# Patient Record
Sex: FEMALE | Race: WHITE | NOT HISPANIC OR LATINO
[De-identification: names, ages, dates, MRNs, and addresses within clinical notes are randomized per-mention and may not be internally consistent; named-entity substitution may affect disease eponyms.]

---

## 2020-11-20 ENCOUNTER — APPOINTMENT (OUTPATIENT)
Dept: CARDIOLOGY | Facility: CLINIC | Age: 75
End: 2020-11-20
Payer: MEDICARE

## 2020-11-20 VITALS
OXYGEN SATURATION: 96 % | WEIGHT: 202 LBS | BODY MASS INDEX: 39.66 KG/M2 | HEIGHT: 60 IN | SYSTOLIC BLOOD PRESSURE: 138 MMHG | DIASTOLIC BLOOD PRESSURE: 82 MMHG | HEART RATE: 69 BPM

## 2020-11-20 DIAGNOSIS — Z82.49 FAMILY HISTORY OF ISCHEMIC HEART DISEASE AND OTHER DISEASES OF THE CIRCULATORY SYSTEM: ICD-10-CM

## 2020-11-20 DIAGNOSIS — Z82.3 FAMILY HISTORY OF STROKE: ICD-10-CM

## 2020-11-20 PROCEDURE — 99204 OFFICE O/P NEW MOD 45 MIN: CPT

## 2020-11-20 PROCEDURE — 99214 OFFICE O/P EST MOD 30 MIN: CPT

## 2020-12-17 RX ORDER — METFORMIN HYDROCHLORIDE 500 MG/1
500 TABLET, COATED ORAL
Refills: 0 | Status: ACTIVE | COMMUNITY
Start: 2020-12-17

## 2020-12-17 RX ORDER — ROSUVASTATIN CALCIUM 20 MG/1
20 TABLET, FILM COATED ORAL
Refills: 0 | Status: ACTIVE | COMMUNITY
Start: 2020-12-17

## 2020-12-17 NOTE — PHYSICAL EXAM
[General Appearance - Well Developed] : well developed [General Appearance - Well Nourished] : well nourished [General Appearance - In No Acute Distress] : no acute distress [Normal Conjunctiva] : the conjunctiva exhibited no abnormalities [No Jugular Venous Pisano A Waves] : no jugular venous pisano A waves [Heart Sounds] : normal S1 and S2 [Respiration, Rhythm And Depth] : normal respiratory rhythm and effort [Auscultation Breath Sounds / Voice Sounds] : lungs were clear to auscultation bilaterally [Abdomen Soft] : soft [Abdomen Tenderness] : non-tender [Cyanosis, Localized] : no localized cyanosis [FreeTextEntry1] : No significant edema

## 2020-12-17 NOTE — REASON FOR VISIT
[FreeTextEntry1] : Roberta Mckenna 75 years old diabetes hypertension obesity abnormal CT coronary angio with nonobstructive coronary disease

## 2020-12-17 NOTE — DISCUSSION/SUMMARY
[FreeTextEntry1] : The patient will continue on her present regimen of medications.  She should continue risk factor modification with weight reduction exercise.  Her LDL should be maintained lower than 70 and presently LDL is 61\par \par Routine follow with me again in 6 months\par Overall she is clinically stable from a cardiac point of view but does have multiple risk factors for ischemic heart disease and needs measures for risk factor reduction which are being undertaken

## 2020-12-17 NOTE — ASSESSMENT
[FreeTextEntry1] : Roberta Mckenna has diabetes hypertension hyperlipidemia all under good control.  She has evidence of atherosclerosis and coronary disease on CT coronary angiogram nonobstructive from several years ago.  She is asymptomatic without chest pain chest pressure shortness of breath she is overweight but is making great attempts at losing weight.  She is on appropriate medications for hypertension and diabetes\par \par 1.  Heart murmur secondary to hypertrophic cardiomyopathy with concentric left ventricular hypertrophy.  Asymptomatic at the present time and stable\par \par 2.  Hyperlipidemia on statin cholesterol 163 triglycerides 129 HDL 76 LDL 61 at goal\par \par 3.  Diabetes type 2 on oral agents recent hemoglobin A1c 5.9\par \par 4.  Elevated BMI

## 2021-06-24 ENCOUNTER — NON-APPOINTMENT (OUTPATIENT)
Age: 76
End: 2021-06-24

## 2021-06-24 ENCOUNTER — APPOINTMENT (OUTPATIENT)
Dept: CARDIOLOGY | Facility: CLINIC | Age: 76
End: 2021-06-24
Payer: MEDICARE

## 2021-06-24 VITALS
SYSTOLIC BLOOD PRESSURE: 132 MMHG | BODY MASS INDEX: 40.44 KG/M2 | WEIGHT: 206 LBS | DIASTOLIC BLOOD PRESSURE: 70 MMHG | HEART RATE: 70 BPM | HEIGHT: 60 IN | OXYGEN SATURATION: 96 %

## 2021-06-24 PROCEDURE — 93000 ELECTROCARDIOGRAM COMPLETE: CPT

## 2021-06-24 PROCEDURE — 99214 OFFICE O/P EST MOD 30 MIN: CPT

## 2021-06-24 NOTE — HISTORY OF PRESENT ILLNESS
[FreeTextEntry1] : Roberta is 75 years old with hypertension well-controlled diabetes type 2 well-controlled hyperlipidemia on statin, elevated BMI (39) and a heart murmur.  She had a CT coronary angiogram many years ago which did reveal nonobstructive coronary disease.  She has been diagnosed with a hypertrophic cardiomyopathy with some calcification of the aortic valve\par \par She is a past history of uterine carcinoma many years ago no recurrence\par \par She denies chest pain chest pressure shortness of breath.  She is active but does not do formal exercise such as vigorous walking.  She denies orthopnea PND palpitations dizziness or syncope.\par \par Noninvasive cardiac evaluation\par EKG normal sinus rhythm left ventricular hypertrophy with repolarization unchanged\par 2D echo 2/18/2020 moderate concentric left ventricular hypertrophy normal left ventricular systolic function grade 1 diastolic dysfunction with impaired relaxation idiopathic hypertrophic subaortic stenosis with systolic anterior motion of the mitral valve severe mitral annular calcification mild pulmonary hypertension\par \par Carotid Doppler revealed some moderate intimal thickening in the right common carotid artery less than 50% stenosis in the right bulb the left common carotid artery had no significant atherosclerotic plaquing and no significant stenosis\par \par Since last visit she has had some increasing shortness of breath with exertion.  She has no exertional chest pain or exertional dizziness\par \par A repeat echocardiogram again revealed mild concentric left ventricle hypertrophy with basal septal hypertrophy left ventricular outflow tract gradient of 59 mm not noted on the prior echo mild to moderate mitral valve regurgitation systolic anterior motion of the anterior leaflet of the mitral valve.  There appears to be some degree of aortic stenosis but cannot be accurately assessed because of the LVOT obstruction\par \par Then she had an episode of shortness of breath with a moderate amount of exertion which was more than she has had in the past..  She has no edema orthopnea PND\par \par EKG reveals normal sinus rhythm left ventricle hypertrophy with repolarization\par \par She has evidence of a hypertrophic left ventricle.  Previous echoes are presently not available to me

## 2021-06-24 NOTE — ASSESSMENT
[FreeTextEntry1] : Roberta Mckenna has diabetes hypertension hyperlipidemia all under good control.  She has evidence of atherosclerosis and coronary disease on CT coronary angiogram nonobstructive from several years ago.  She is asymptomatic without chest pain chest pressure shortness of breath she is overweight but is making great attempts at losing weight.  She is on appropriate medications for hypertension and diabetes\par \par 1.  Heart murmur secondary to hypertrophic cardiomyopathy with concentric left ventricular hypertrophy.  With resting gradient of 59 mm.  The murmur does increase with Valsalva.  She recently had increasing shortness of breath which is now less but all episodes of shortness of breath are exertionally related.  This certainly could be related to the obstructive hypertrophic cardiomyopathy and diastolic dysfunction.\par \par 2.  Hyperlipidemia on statin cholesterol 163 triglycerides 129 HDL 76 LDL 61 at goal\par \par 3.  Diabetes type 2 on oral agents recent hemoglobin A1c 5.9\par \par 4.  Elevated BMI

## 2021-06-24 NOTE — REASON FOR VISIT
[FreeTextEntry1] : Alena Barretoust 76 years old recent increase in her shortness of breath with known hypertrophic cardiomyopathy with abnormal echo with moderate outflow tract obstruction

## 2021-07-01 ENCOUNTER — APPOINTMENT (OUTPATIENT)
Dept: CT IMAGING | Facility: CLINIC | Age: 76
End: 2021-07-01
Payer: MEDICARE

## 2021-07-01 ENCOUNTER — OUTPATIENT (OUTPATIENT)
Dept: OUTPATIENT SERVICES | Facility: HOSPITAL | Age: 76
LOS: 1 days | End: 2021-07-01
Payer: MEDICARE

## 2021-07-01 DIAGNOSIS — I10 ESSENTIAL (PRIMARY) HYPERTENSION: ICD-10-CM

## 2021-07-01 DIAGNOSIS — I42.2 OTHER HYPERTROPHIC CARDIOMYOPATHY: ICD-10-CM

## 2021-07-01 DIAGNOSIS — Z85.42 PERSONAL HISTORY OF MALIGNANT NEOPLASM OF OTHER PARTS OF UTERUS: Chronic | ICD-10-CM

## 2021-07-01 DIAGNOSIS — E78.2 MIXED HYPERLIPIDEMIA: ICD-10-CM

## 2021-07-01 PROCEDURE — 75574 CT ANGIO HRT W/3D IMAGE: CPT

## 2021-07-01 PROCEDURE — 82565 ASSAY OF CREATININE: CPT

## 2021-07-01 PROCEDURE — 75574 CT ANGIO HRT W/3D IMAGE: CPT | Mod: 26,MH

## 2021-07-07 ENCOUNTER — NON-APPOINTMENT (OUTPATIENT)
Age: 76
End: 2021-07-07

## 2021-07-12 ENCOUNTER — OUTPATIENT (OUTPATIENT)
Dept: OUTPATIENT SERVICES | Facility: HOSPITAL | Age: 76
LOS: 1 days | End: 2021-07-12
Payer: MEDICARE

## 2021-07-12 VITALS
OXYGEN SATURATION: 98 % | SYSTOLIC BLOOD PRESSURE: 157 MMHG | RESPIRATION RATE: 20 BRPM | HEART RATE: 76 BPM | TEMPERATURE: 99 F | DIASTOLIC BLOOD PRESSURE: 71 MMHG | HEIGHT: 55 IN | WEIGHT: 197.09 LBS

## 2021-07-12 VITALS
HEART RATE: 63 BPM | RESPIRATION RATE: 22 BRPM | DIASTOLIC BLOOD PRESSURE: 70 MMHG | OXYGEN SATURATION: 95 % | SYSTOLIC BLOOD PRESSURE: 139 MMHG

## 2021-07-12 DIAGNOSIS — Z85.42 PERSONAL HISTORY OF MALIGNANT NEOPLASM OF OTHER PARTS OF UTERUS: Chronic | ICD-10-CM

## 2021-07-12 DIAGNOSIS — I25.10 ATHEROSCLEROTIC HEART DISEASE OF NATIVE CORONARY ARTERY WITHOUT ANGINA PECTORIS: ICD-10-CM

## 2021-07-12 DIAGNOSIS — I42.2 OTHER HYPERTROPHIC CARDIOMYOPATHY: ICD-10-CM

## 2021-07-12 LAB
ANION GAP SERPL CALC-SCNC: 15 MMOL/L — SIGNIFICANT CHANGE UP (ref 5–17)
BASOPHILS # BLD AUTO: 0.05 K/UL — SIGNIFICANT CHANGE UP (ref 0–0.2)
BASOPHILS NFR BLD AUTO: 0.9 % — SIGNIFICANT CHANGE UP (ref 0–2)
BUN SERPL-MCNC: 25 MG/DL — HIGH (ref 7–23)
CALCIUM SERPL-MCNC: 10.3 MG/DL — SIGNIFICANT CHANGE UP (ref 8.4–10.5)
CHLORIDE SERPL-SCNC: 103 MMOL/L — SIGNIFICANT CHANGE UP (ref 96–108)
CO2 SERPL-SCNC: 20 MMOL/L — LOW (ref 22–31)
CREAT SERPL-MCNC: 0.89 MG/DL — SIGNIFICANT CHANGE UP (ref 0.5–1.3)
EOSINOPHIL # BLD AUTO: 0.12 K/UL — SIGNIFICANT CHANGE UP (ref 0–0.5)
EOSINOPHIL NFR BLD AUTO: 2.2 % — SIGNIFICANT CHANGE UP (ref 0–6)
GLUCOSE BLDC GLUCOMTR-MCNC: 118 MG/DL — HIGH (ref 70–99)
GLUCOSE SERPL-MCNC: 122 MG/DL — HIGH (ref 70–99)
HCT VFR BLD CALC: 37.3 % — SIGNIFICANT CHANGE UP (ref 34.5–45)
HGB BLD-MCNC: 12.2 G/DL — SIGNIFICANT CHANGE UP (ref 11.5–15.5)
IMM GRANULOCYTES NFR BLD AUTO: 0.2 % — SIGNIFICANT CHANGE UP (ref 0–1.5)
LYMPHOCYTES # BLD AUTO: 1.5 K/UL — SIGNIFICANT CHANGE UP (ref 1–3.3)
LYMPHOCYTES # BLD AUTO: 27.8 % — SIGNIFICANT CHANGE UP (ref 13–44)
MCHC RBC-ENTMCNC: 29.2 PG — SIGNIFICANT CHANGE UP (ref 27–34)
MCHC RBC-ENTMCNC: 32.7 GM/DL — SIGNIFICANT CHANGE UP (ref 32–36)
MCV RBC AUTO: 89.2 FL — SIGNIFICANT CHANGE UP (ref 80–100)
MONOCYTES # BLD AUTO: 0.59 K/UL — SIGNIFICANT CHANGE UP (ref 0–0.9)
MONOCYTES NFR BLD AUTO: 10.9 % — SIGNIFICANT CHANGE UP (ref 2–14)
NEUTROPHILS # BLD AUTO: 3.12 K/UL — SIGNIFICANT CHANGE UP (ref 1.8–7.4)
NEUTROPHILS NFR BLD AUTO: 58 % — SIGNIFICANT CHANGE UP (ref 43–77)
NRBC # BLD: 0 /100 WBCS — SIGNIFICANT CHANGE UP (ref 0–0)
PLATELET # BLD AUTO: 159 K/UL — SIGNIFICANT CHANGE UP (ref 150–400)
POTASSIUM SERPL-MCNC: 4.3 MMOL/L — SIGNIFICANT CHANGE UP (ref 3.5–5.3)
POTASSIUM SERPL-SCNC: 4.3 MMOL/L — SIGNIFICANT CHANGE UP (ref 3.5–5.3)
RBC # BLD: 4.18 M/UL — SIGNIFICANT CHANGE UP (ref 3.8–5.2)
RBC # FLD: 14.2 % — SIGNIFICANT CHANGE UP (ref 10.3–14.5)
SODIUM SERPL-SCNC: 138 MMOL/L — SIGNIFICANT CHANGE UP (ref 135–145)
WBC # BLD: 5.39 K/UL — SIGNIFICANT CHANGE UP (ref 3.8–10.5)
WBC # FLD AUTO: 5.39 K/UL — SIGNIFICANT CHANGE UP (ref 3.8–10.5)

## 2021-07-12 PROCEDURE — 99153 MOD SED SAME PHYS/QHP EA: CPT

## 2021-07-12 PROCEDURE — 93005 ELECTROCARDIOGRAM TRACING: CPT

## 2021-07-12 PROCEDURE — 85025 COMPLETE CBC W/AUTO DIFF WBC: CPT

## 2021-07-12 PROCEDURE — C1769: CPT

## 2021-07-12 PROCEDURE — 99152 MOD SED SAME PHYS/QHP 5/>YRS: CPT

## 2021-07-12 PROCEDURE — 82962 GLUCOSE BLOOD TEST: CPT

## 2021-07-12 PROCEDURE — 80048 BASIC METABOLIC PNL TOTAL CA: CPT

## 2021-07-12 PROCEDURE — 93458 L HRT ARTERY/VENTRICLE ANGIO: CPT | Mod: 26

## 2021-07-12 PROCEDURE — C1894: CPT

## 2021-07-12 PROCEDURE — 93010 ELECTROCARDIOGRAM REPORT: CPT

## 2021-07-12 PROCEDURE — 93458 L HRT ARTERY/VENTRICLE ANGIO: CPT

## 2021-07-12 RX ORDER — METFORMIN HYDROCHLORIDE 850 MG/1
1 TABLET ORAL
Qty: 0 | Refills: 0 | DISCHARGE

## 2021-07-12 RX ORDER — METOPROLOL TARTRATE 50 MG
1 TABLET ORAL
Qty: 30 | Refills: 3
Start: 2021-07-12 | End: 2021-11-08

## 2021-07-12 RX ORDER — METOPROLOL TARTRATE 50 MG
1 TABLET ORAL
Qty: 0 | Refills: 0 | DISCHARGE

## 2021-07-12 NOTE — ASU DISCHARGE PLAN (ADULT/PEDIATRIC) - CARE PROVIDER_API CALL
Jackson Lee (MD)  Cardiovascular Disease; Internal Medicine; Interventional Cardiology  [address] [place] [address]  Phone: [phone]  Fax: [phone]  Follow Up Time: 2 weeks

## 2021-07-23 ENCOUNTER — APPOINTMENT (OUTPATIENT)
Dept: CARDIOLOGY | Facility: CLINIC | Age: 76
End: 2021-07-23
Payer: MEDICARE

## 2021-07-23 ENCOUNTER — RX RENEWAL (OUTPATIENT)
Age: 76
End: 2021-07-23

## 2021-07-23 VITALS
DIASTOLIC BLOOD PRESSURE: 70 MMHG | HEART RATE: 78 BPM | SYSTOLIC BLOOD PRESSURE: 125 MMHG | WEIGHT: 195 LBS | BODY MASS INDEX: 38.08 KG/M2 | OXYGEN SATURATION: 96 %

## 2021-07-23 PROCEDURE — 99214 OFFICE O/P EST MOD 30 MIN: CPT

## 2021-07-23 RX ORDER — VALSARTAN AND HYDROCHLOROTHIAZIDE 160; 25 MG/1; MG/1
160-25 TABLET, FILM COATED ORAL
Refills: 0 | Status: DISCONTINUED | COMMUNITY
Start: 2020-12-17 | End: 2021-07-23

## 2021-07-23 RX ORDER — VALSARTAN 160 MG/1
160 TABLET, COATED ORAL DAILY
Qty: 90 | Refills: 0 | Status: ACTIVE | COMMUNITY
Start: 2021-07-23 | End: 1900-01-01

## 2021-07-23 NOTE — DISCUSSION/SUMMARY
[FreeTextEntry1] : 1.  Continue with weight loss\par 2.  Continue metoprolol 100 in the a.m. and 50 in the p.m. which can be increased to 100 twice daily\par 3.  Change valsartan/hydrochlorthiazide to just valsartan 160 so as to avoid any diuresis which could worsen the gradient\par 4.  If shortness of breath persists on higher doses of beta-blocker, we can consider adding Norpace 150 3 times daily although side effects potential proarrhythmia (unlikely) and dry mouth etc.  Patient does not like side effects from medication\par \par Patient will follow up again in 6 weeks.  She will increase her level of exercise.  I would have a low threshold to increase her metoprolol to 100 twice daily

## 2021-07-23 NOTE — HISTORY OF PRESENT ILLNESS
[FreeTextEntry1] : Roberta is 75 years old with hypertension well-controlled diabetes type 2 well-controlled hyperlipidemia on statin, elevated BMI (39) and a heart murmur.  She had a CT coronary angiogram many years ago which did reveal nonobstructive coronary disease.  She has been diagnosed with a hypertrophic cardiomyopathy with some calcification of the aortic valve\par \par She is a past history of uterine carcinoma many years ago no recurrence\par \par She denies chest pain chest pressure shortness of breath.  She is active but does not do formal exercise such as vigorous walking.  She denies orthopnea PND palpitations dizziness or syncope.\par \par Noninvasive cardiac evaluation\par EKG normal sinus rhythm left ventricular hypertrophy with repolarization unchanged\par 2D echo 2/18/2020 moderate concentric left ventricular hypertrophy normal left ventricular systolic function grade 1 diastolic dysfunction with impaired relaxation idiopathic hypertrophic subaortic stenosis with systolic anterior motion of the mitral valve severe mitral annular calcification mild pulmonary hypertension\par \par Carotid Doppler revealed some moderate intimal thickening in the right common carotid artery less than 50% stenosis in the right bulb the left common carotid artery had no significant atherosclerotic plaquing and no significant stenosis\par \par Since last visit she has had some increasing shortness of breath with exertion.  She has no exertional chest pain or exertional dizziness\par \par A repeat echocardiogram again revealed mild concentric left ventricle hypertrophy with basal septal hypertrophy left ventricular outflow tract gradient of 59 mm not noted on the prior echo mild to moderate mitral valve regurgitation systolic anterior motion of the anterior leaflet of the mitral valve.  There appears to be some degree of aortic stenosis but cannot be accurately assessed because of the LVOT obstruction\par \par Then she had an episode of shortness of breath with a moderate amount of exertion which was more than she has had in the past..  She has no edema orthopnea PND\par \par EKG reveals normal sinus rhythm left ventricle hypertrophy with repolarization\par \par CT coronary angiogram was nonconclusive but suggested the possibility of significant stenosis\par \par Subsequent to this she underwent cardiac catheterization which revealed nonobstructive coronary disease no significant stenosis, hyperdynamic left ventricle with a resting gradient which increased significantly with provocation such as PVC post PVC and Valsalva\par \par Patient had been complaining of increasing shortness of breath.  We increased her beta-blocker to 100 in the morning and 50 at night.  She has lost 6 pounds.  Overall she is feeling better with less shortness of breath she is able to do more activity.  She has no chest pain palpitations dizziness or syncope\par \par Patient remains on valsartan 160/12.5.  She is now on Toprol- in the a.m. and 50 in the p.m. along with her other medications for hyperlipidemia and diabetes\par \par \par \par She has evidence of a hypertrophic left ventricle.  Previous echoes are presently not available to me

## 2021-07-23 NOTE — REASON FOR VISIT
[FreeTextEntry1] : Roberta Mckenna with a hypertrophic cardiomyopathy recently underwent catheterization which revealed nonobstructive coronary disease and some resting gradient with a hyperdynamic ventricle which increased with provocative maneuvers.  She is here for follow-up

## 2021-07-23 NOTE — ASSESSMENT
[FreeTextEntry1] : Roberta Mckenna has diabetes hypertension hyperlipidemia all under good control.  She has evidence of atherosclerosis and coronary disease on CT coronary angiogram nonobstructive from several years ago.  She is asymptomatic without chest pain chest pressure shortness of breath she is overweight but is making great attempts at losing weight.  She is on appropriate medications for hypertension and diabetes.  Recent cardiac catheterization revealed nonobstructive coronary disease with a hypertrophic cardiomyopathy with a dynamic and resting gradient\par \par 1.  Hypertrophic cardiomyopathy with resting and provocable gradient now improved symptoms of shortness of breath on 100 of metoprolol in the morning and 50 at night.  I would stop the valsartan 160/12.5 and just leave her on valsartan without the diuretic\par \par 2.  Hyperlipidemia on statin cholesterol 163 triglycerides 129 HDL 76 LDL 61 at goal\par \par 3.  Diabetes type 2 on oral agents recent hemoglobin A1c 5.9\par \par 4.  Elevated BMI patient has lost 6 pounds since last visit\par \par 5.  Nonobstructive coronary disease

## 2021-09-03 ENCOUNTER — NON-APPOINTMENT (OUTPATIENT)
Age: 76
End: 2021-09-03

## 2021-09-03 ENCOUNTER — APPOINTMENT (OUTPATIENT)
Dept: CARDIOLOGY | Facility: CLINIC | Age: 76
End: 2021-09-03
Payer: MEDICARE

## 2021-09-03 VITALS
SYSTOLIC BLOOD PRESSURE: 134 MMHG | OXYGEN SATURATION: 97 % | HEART RATE: 73 BPM | HEIGHT: 60 IN | DIASTOLIC BLOOD PRESSURE: 84 MMHG | WEIGHT: 194 LBS | BODY MASS INDEX: 38.09 KG/M2 | RESPIRATION RATE: 17 BRPM

## 2021-09-03 PROCEDURE — 99213 OFFICE O/P EST LOW 20 MIN: CPT

## 2021-09-03 NOTE — ASSESSMENT
[FreeTextEntry1] : Roberta Mckenna has diabetes hypertension hyperlipidemia all under good control.  She has evidence of atherosclerosis and coronary disease on CT coronary angiogram nonobstructive from several years ago.  She is asymptomatic without chest pain chest pressure shortness of breath she is overweight but is making great attempts at losing weight.  She is on appropriate medications for hypertension and diabetes.  Recent cardiac catheterization revealed nonobstructive coronary disease with a hypertrophic cardiomyopathy with a dynamic and resting gradient.  The patient is feeling better since the last visit with some weight loss and the increase in the Toprol to 150 mg a day.\par \par 1.  Hypertrophic cardiomyopathy with resting and provocable gradient now improved symptoms of shortness of breath on 100 of metoprolol in the morning and 50 at night.  Her pulse rate is still 75-80 and there is more room for higher dose beta-blocker\par \par 2.  Hyperlipidemia on statin cholesterol 163 triglycerides 129 HDL 76 LDL 61 at goal\par \par 3.  Diabetes type 2 on oral agents recent hemoglobin A1c 5.9\par \par 4.  Elevated BMI patient has lost 6 pounds since last visit\par \par 5.  Nonobstructive coronary disease

## 2021-09-03 NOTE — DISCUSSION/SUMMARY
[FreeTextEntry1] : The patient continues to do better and we have more room to work with beta-blocker.  I recommend that she increase the Toprol-XL to 100 twice daily for a total of 200 a day and remain on the valsartan.  She will continue with risk factor modification for calcified coronaries with nonobstructive disease\par \par Routine follow with me again in 3 months\par Urged weight reduction

## 2021-09-03 NOTE — HISTORY OF PRESENT ILLNESS
[FreeTextEntry1] : Roberta is 75 years old with hypertension well-controlled diabetes type 2 well-controlled hyperlipidemia on statin, elevated BMI (39) and a heart murmur.  She had a CT coronary angiogram many years ago which did reveal nonobstructive coronary disease.  She has been diagnosed with a hypertrophic cardiomyopathy with some calcification of the aortic valve\par \par She is a past history of uterine carcinoma many years ago no recurrence\par \par She denies chest pain chest pressure shortness of breath.  She is active but does not do formal exercise such as vigorous walking.  She denies orthopnea PND palpitations dizziness or syncope.\par \par Noninvasive cardiac evaluation\par EKG normal sinus rhythm left ventricular hypertrophy with repolarization unchanged\par 2D echo 2/18/2020 moderate concentric left ventricular hypertrophy normal left ventricular systolic function grade 1 diastolic dysfunction with impaired relaxation idiopathic hypertrophic subaortic stenosis with systolic anterior motion of the mitral valve severe mitral annular calcification mild pulmonary hypertension\par \par Carotid Doppler revealed some moderate intimal thickening in the right common carotid artery less than 50% stenosis in the right bulb the left common carotid artery had no significant atherosclerotic plaquing and no significant stenosis\par \par Since last visit she has had some increasing shortness of breath with exertion.  She has no exertional chest pain or exertional dizziness\par \par A repeat echocardiogram again revealed mild concentric left ventricle hypertrophy with basal septal hypertrophy left ventricular outflow tract gradient of 59 mm not noted on the prior echo mild to moderate mitral valve regurgitation systolic anterior motion of the anterior leaflet of the mitral valve.  There appears to be some degree of aortic stenosis but cannot be accurately assessed because of the LVOT obstruction\par \par Then she had an episode of shortness of breath with a moderate amount of exertion which was more than she has had in the past..  She has no edema orthopnea PND\par \par EKG reveals normal sinus rhythm left ventricle hypertrophy with repolarization\par \par CT coronary angiogram was nonconclusive but suggested the possibility of significant stenosis\par \par Subsequent to this she underwent cardiac catheterization which revealed nonobstructive coronary disease no significant stenosis, hyperdynamic left ventricle with a resting gradient which increased significantly with provocation such as PVC post PVC and Valsalva\par \par Patient had been complaining of increasing shortness of breath.  We increased her beta-blocker to 100 in the morning and 50 at night.  She has lost 6 pounds.  Overall she is feeling better with less shortness of breath she is able to do more activity.  She has no chest pain palpitations dizziness or syncope\par \par Patient remains on valsartan 160/12.5.  She is now on Toprol- in the a.m. and 50 in the p.m. along with her other medications for hyperlipidemia and diabetes\par \par She continues to feel better with less shortness of breath and is tolerating 150 mg of Toprol.  She has no chest pain dizziness or syncope.  She denies palpitations\par \par \par \par She has evidence of a hypertrophic left ventricle.  Previous echoes are presently not available to me

## 2021-09-15 NOTE — HISTORY OF PRESENT ILLNESS
[FreeTextEntry1] : Roberta is 75 years old with hypertension well-controlled diabetes type 2 well-controlled hyperlipidemia on statin, elevated BMI (39) and a heart murmur.  She had a CT coronary angiogram many years ago which did reveal nonobstructive coronary disease.  She has been diagnosed with a hypertrophic cardiomyopathy with some calcification of the aortic valve\par \par She is a past history of uterine carcinoma many years ago no recurrence\par \par She denies chest pain chest pressure shortness of breath.  She is active but does not do formal exercise such as vigorous walking.  She denies orthopnea PND palpitations dizziness or syncope.\par \par Noninvasive cardiac evaluation\par EKG normal sinus rhythm left ventricular hypertrophy with repolarization unchanged\par 2D echo 2/18/2020 moderate concentric left ventricular hypertrophy normal left ventricular systolic function grade 1 diastolic dysfunction with impaired relaxation idiopathic hypertrophic subaortic stenosis with systolic anterior motion of the mitral valve severe mitral annular calcification mild pulmonary hypertension\par \par Carotid Doppler revealed some moderate intimal thickening in the right common carotid artery less than 50% stenosis in the right bulb the left common carotid artery had no significant atherosclerotic plaquing and no significant stenosis\par \par She is followed carefully by Dr. Juan Manuel Valdez and has routine blood work echocardiograms etc.\par \par She has evidence of a hypertrophic left ventricle.  Previous echoes are presently not available to me
cell [phone]/patient

## 2021-10-29 ENCOUNTER — INPATIENT (INPATIENT)
Facility: HOSPITAL | Age: 76
LOS: 2 days | Discharge: HOME | End: 2021-11-01
Attending: INTERNAL MEDICINE | Admitting: INTERNAL MEDICINE
Payer: MEDICARE

## 2021-10-29 ENCOUNTER — OUTPATIENT (OUTPATIENT)
Dept: OUTPATIENT SERVICES | Facility: HOSPITAL | Age: 76
LOS: 1 days | Discharge: HOME | End: 2021-10-29

## 2021-10-29 ENCOUNTER — RESULT REVIEW (OUTPATIENT)
Age: 76
End: 2021-10-29

## 2021-10-29 VITALS
HEIGHT: 60 IN | RESPIRATION RATE: 18 BRPM | DIASTOLIC BLOOD PRESSURE: 75 MMHG | TEMPERATURE: 98 F | SYSTOLIC BLOOD PRESSURE: 109 MMHG | HEART RATE: 84 BPM | OXYGEN SATURATION: 98 %

## 2021-10-29 VITALS
DIASTOLIC BLOOD PRESSURE: 57 MMHG | SYSTOLIC BLOOD PRESSURE: 142 MMHG | RESPIRATION RATE: 18 BRPM | OXYGEN SATURATION: 98 % | HEART RATE: 85 BPM

## 2021-10-29 VITALS
SYSTOLIC BLOOD PRESSURE: 113 MMHG | RESPIRATION RATE: 18 BRPM | TEMPERATURE: 98 F | WEIGHT: 188.05 LBS | HEIGHT: 60 IN | HEART RATE: 90 BPM | DIASTOLIC BLOOD PRESSURE: 57 MMHG | OXYGEN SATURATION: 97 %

## 2021-10-29 DIAGNOSIS — Z85.42 PERSONAL HISTORY OF MALIGNANT NEOPLASM OF OTHER PARTS OF UTERUS: Chronic | ICD-10-CM

## 2021-10-29 DIAGNOSIS — K21.00 GASTRO-ESOPHAGEAL REFLUX DISEASE WITH ESOPHAGITIS, WITHOUT BLEEDING: ICD-10-CM

## 2021-10-29 DIAGNOSIS — I10 ESSENTIAL (PRIMARY) HYPERTENSION: ICD-10-CM

## 2021-10-29 DIAGNOSIS — E11.9 TYPE 2 DIABETES MELLITUS WITHOUT COMPLICATIONS: ICD-10-CM

## 2021-10-29 DIAGNOSIS — Z29.9 ENCOUNTER FOR PROPHYLACTIC MEASURES, UNSPECIFIED: ICD-10-CM

## 2021-10-29 DIAGNOSIS — K92.0 HEMATEMESIS: ICD-10-CM

## 2021-10-29 DIAGNOSIS — E78.5 HYPERLIPIDEMIA, UNSPECIFIED: ICD-10-CM

## 2021-10-29 DIAGNOSIS — R55 SYNCOPE AND COLLAPSE: ICD-10-CM

## 2021-10-29 LAB
ALBUMIN SERPL ELPH-MCNC: 4.4 G/DL — SIGNIFICANT CHANGE UP (ref 3.3–5)
ALP SERPL-CCNC: 49 U/L — SIGNIFICANT CHANGE UP (ref 40–120)
ALT FLD-CCNC: 12 U/L — SIGNIFICANT CHANGE UP (ref 4–33)
ANION GAP SERPL CALC-SCNC: 18 MMOL/L — HIGH (ref 7–14)
APTT BLD: 29.7 SEC — SIGNIFICANT CHANGE UP (ref 27–36.3)
AST SERPL-CCNC: 22 U/L — SIGNIFICANT CHANGE UP (ref 4–32)
BASOPHILS # BLD AUTO: 0.06 K/UL — SIGNIFICANT CHANGE UP (ref 0–0.2)
BASOPHILS NFR BLD AUTO: 0.5 % — SIGNIFICANT CHANGE UP (ref 0–2)
BILIRUB SERPL-MCNC: 0.7 MG/DL — SIGNIFICANT CHANGE UP (ref 0.2–1.2)
BLD GP AB SCN SERPL QL: NEGATIVE — SIGNIFICANT CHANGE UP
BUN SERPL-MCNC: 37 MG/DL — HIGH (ref 7–23)
CALCIUM SERPL-MCNC: 10.3 MG/DL — SIGNIFICANT CHANGE UP (ref 8.4–10.5)
CHLORIDE SERPL-SCNC: 100 MMOL/L — SIGNIFICANT CHANGE UP (ref 98–107)
CO2 SERPL-SCNC: 23 MMOL/L — SIGNIFICANT CHANGE UP (ref 22–31)
CREAT SERPL-MCNC: 0.98 MG/DL — SIGNIFICANT CHANGE UP (ref 0.5–1.3)
EOSINOPHIL # BLD AUTO: 0.06 K/UL — SIGNIFICANT CHANGE UP (ref 0–0.5)
EOSINOPHIL NFR BLD AUTO: 0.5 % — SIGNIFICANT CHANGE UP (ref 0–6)
GLUCOSE BLDC GLUCOMTR-MCNC: 100 MG/DL — HIGH (ref 70–99)
GLUCOSE BLDC GLUCOMTR-MCNC: 123 MG/DL — HIGH (ref 70–99)
GLUCOSE SERPL-MCNC: 151 MG/DL — HIGH (ref 70–99)
HCT VFR BLD CALC: 37.6 % — SIGNIFICANT CHANGE UP (ref 34.5–45)
HGB BLD-MCNC: 11.9 G/DL — SIGNIFICANT CHANGE UP (ref 11.5–15.5)
IANC: 8.59 K/UL — HIGH (ref 1.5–8.5)
IMM GRANULOCYTES NFR BLD AUTO: 0.3 % — SIGNIFICANT CHANGE UP (ref 0–1.5)
INR BLD: 1.18 RATIO — HIGH (ref 0.88–1.16)
LYMPHOCYTES # BLD AUTO: 1.47 K/UL — SIGNIFICANT CHANGE UP (ref 1–3.3)
LYMPHOCYTES # BLD AUTO: 13.1 % — SIGNIFICANT CHANGE UP (ref 13–44)
MCHC RBC-ENTMCNC: 28.9 PG — SIGNIFICANT CHANGE UP (ref 27–34)
MCHC RBC-ENTMCNC: 31.6 GM/DL — LOW (ref 32–36)
MCV RBC AUTO: 91.3 FL — SIGNIFICANT CHANGE UP (ref 80–100)
MONOCYTES # BLD AUTO: 0.98 K/UL — HIGH (ref 0–0.9)
MONOCYTES NFR BLD AUTO: 8.8 % — SIGNIFICANT CHANGE UP (ref 2–14)
NEUTROPHILS # BLD AUTO: 8.59 K/UL — HIGH (ref 1.8–7.4)
NEUTROPHILS NFR BLD AUTO: 76.8 % — SIGNIFICANT CHANGE UP (ref 43–77)
NRBC # BLD: 0 /100 WBCS — SIGNIFICANT CHANGE UP
NRBC # FLD: 0 K/UL — SIGNIFICANT CHANGE UP
PLATELET # BLD AUTO: 216 K/UL — SIGNIFICANT CHANGE UP (ref 150–400)
POTASSIUM SERPL-MCNC: 4.6 MMOL/L — SIGNIFICANT CHANGE UP (ref 3.5–5.3)
POTASSIUM SERPL-SCNC: 4.6 MMOL/L — SIGNIFICANT CHANGE UP (ref 3.5–5.3)
PROT SERPL-MCNC: 7.4 G/DL — SIGNIFICANT CHANGE UP (ref 6–8.3)
PROTHROM AB SERPL-ACNC: 13.3 SEC — SIGNIFICANT CHANGE UP (ref 10.6–13.6)
RBC # BLD: 4.12 M/UL — SIGNIFICANT CHANGE UP (ref 3.8–5.2)
RBC # FLD: 14.5 % — SIGNIFICANT CHANGE UP (ref 10.3–14.5)
RH IG SCN BLD-IMP: POSITIVE — SIGNIFICANT CHANGE UP
RH IG SCN BLD-IMP: POSITIVE — SIGNIFICANT CHANGE UP
SODIUM SERPL-SCNC: 141 MMOL/L — SIGNIFICANT CHANGE UP (ref 135–145)
TROPONIN T, HIGH SENSITIVITY RESULT: 26 NG/L — SIGNIFICANT CHANGE UP
TROPONIN T, HIGH SENSITIVITY RESULT: 29 NG/L — SIGNIFICANT CHANGE UP
WBC # BLD: 11.19 K/UL — HIGH (ref 3.8–10.5)
WBC # FLD AUTO: 11.19 K/UL — HIGH (ref 3.8–10.5)

## 2021-10-29 PROCEDURE — 70450 CT HEAD/BRAIN W/O DYE: CPT | Mod: 26,MA

## 2021-10-29 PROCEDURE — 72125 CT NECK SPINE W/O DYE: CPT | Mod: 26,MA

## 2021-10-29 PROCEDURE — 12031 INTMD RPR S/A/T/EXT 2.5 CM/<: CPT | Mod: GC

## 2021-10-29 PROCEDURE — 99285 EMERGENCY DEPT VISIT HI MDM: CPT | Mod: 25,GC

## 2021-10-29 PROCEDURE — 71045 X-RAY EXAM CHEST 1 VIEW: CPT | Mod: 26

## 2021-10-29 PROCEDURE — 88305 TISSUE EXAM BY PATHOLOGIST: CPT | Mod: 26

## 2021-10-29 PROCEDURE — 93010 ELECTROCARDIOGRAM REPORT: CPT | Mod: 59,GC

## 2021-10-29 RX ORDER — LANOLIN ALCOHOL/MO/W.PET/CERES
3 CREAM (GRAM) TOPICAL AT BEDTIME
Refills: 0 | Status: DISCONTINUED | OUTPATIENT
Start: 2021-10-29 | End: 2021-11-01

## 2021-10-29 RX ORDER — ZINC SULFATE TAB 220 MG (50 MG ZINC EQUIVALENT) 220 (50 ZN) MG
1 TAB ORAL
Qty: 0 | Refills: 0 | DISCHARGE

## 2021-10-29 RX ORDER — MULTIVIT WITH MIN/MFOLATE/K2 340-15/3 G
0 POWDER (GRAM) ORAL
Qty: 0 | Refills: 0 | DISCHARGE

## 2021-10-29 RX ORDER — PANTOPRAZOLE SODIUM 20 MG/1
80 TABLET, DELAYED RELEASE ORAL ONCE
Refills: 0 | Status: COMPLETED | OUTPATIENT
Start: 2021-10-29 | End: 2021-10-29

## 2021-10-29 RX ORDER — ACETAMINOPHEN 500 MG
2 TABLET ORAL
Qty: 0 | Refills: 0 | DISCHARGE

## 2021-10-29 RX ORDER — SODIUM CHLORIDE 9 MG/ML
1000 INJECTION, SOLUTION INTRAVENOUS
Refills: 0 | Status: DISCONTINUED | OUTPATIENT
Start: 2021-10-29 | End: 2021-11-01

## 2021-10-29 RX ORDER — ASCORBIC ACID 60 MG
1 TABLET,CHEWABLE ORAL
Qty: 0 | Refills: 0 | DISCHARGE

## 2021-10-29 RX ORDER — SODIUM CHLORIDE 9 MG/ML
1000 INJECTION INTRAMUSCULAR; INTRAVENOUS; SUBCUTANEOUS
Refills: 0 | Status: DISCONTINUED | OUTPATIENT
Start: 2021-10-29 | End: 2021-10-30

## 2021-10-29 RX ORDER — INSULIN LISPRO 100/ML
VIAL (ML) SUBCUTANEOUS
Refills: 0 | Status: DISCONTINUED | OUTPATIENT
Start: 2021-10-29 | End: 2021-11-01

## 2021-10-29 RX ORDER — DEXTROSE 50 % IN WATER 50 %
25 SYRINGE (ML) INTRAVENOUS ONCE
Refills: 0 | Status: DISCONTINUED | OUTPATIENT
Start: 2021-10-29 | End: 2021-11-01

## 2021-10-29 RX ORDER — GLUCAGON INJECTION, SOLUTION 0.5 MG/.1ML
1 INJECTION, SOLUTION SUBCUTANEOUS ONCE
Refills: 0 | Status: DISCONTINUED | OUTPATIENT
Start: 2021-10-29 | End: 2021-11-01

## 2021-10-29 RX ORDER — ROSUVASTATIN CALCIUM 5 MG/1
1 TABLET ORAL
Qty: 0 | Refills: 0 | DISCHARGE

## 2021-10-29 RX ORDER — CHOLECALCIFEROL (VITAMIN D3) 125 MCG
1 CAPSULE ORAL
Qty: 0 | Refills: 0 | DISCHARGE

## 2021-10-29 RX ORDER — PANTOPRAZOLE SODIUM 20 MG/1
1 TABLET, DELAYED RELEASE ORAL
Qty: 0 | Refills: 0 | DISCHARGE

## 2021-10-29 RX ORDER — DEXTROSE 50 % IN WATER 50 %
12.5 SYRINGE (ML) INTRAVENOUS ONCE
Refills: 0 | Status: DISCONTINUED | OUTPATIENT
Start: 2021-10-29 | End: 2021-11-01

## 2021-10-29 RX ORDER — ACETAMINOPHEN 500 MG
650 TABLET ORAL EVERY 6 HOURS
Refills: 0 | Status: DISCONTINUED | OUTPATIENT
Start: 2021-10-29 | End: 2021-11-01

## 2021-10-29 RX ORDER — ONDANSETRON 8 MG/1
4 TABLET, FILM COATED ORAL EVERY 8 HOURS
Refills: 0 | Status: DISCONTINUED | OUTPATIENT
Start: 2021-10-29 | End: 2021-11-01

## 2021-10-29 RX ORDER — SEMAGLUTIDE 0.68 MG/ML
0 INJECTION, SOLUTION SUBCUTANEOUS
Qty: 0 | Refills: 0 | DISCHARGE

## 2021-10-29 RX ORDER — FLUTICASONE PROPIONATE AND SALMETEROL 50; 250 UG/1; UG/1
1 POWDER ORAL; RESPIRATORY (INHALATION)
Qty: 0 | Refills: 0 | DISCHARGE

## 2021-10-29 RX ORDER — DEXTROSE 50 % IN WATER 50 %
15 SYRINGE (ML) INTRAVENOUS ONCE
Refills: 0 | Status: DISCONTINUED | OUTPATIENT
Start: 2021-10-29 | End: 2021-11-01

## 2021-10-29 RX ORDER — ONDANSETRON 8 MG/1
4 TABLET, FILM COATED ORAL ONCE
Refills: 0 | Status: COMPLETED | OUTPATIENT
Start: 2021-10-29 | End: 2021-10-29

## 2021-10-29 RX ORDER — METOPROLOL TARTRATE 50 MG
1 TABLET ORAL
Qty: 0 | Refills: 0 | DISCHARGE

## 2021-10-29 RX ORDER — ATORVASTATIN CALCIUM 80 MG/1
20 TABLET, FILM COATED ORAL AT BEDTIME
Refills: 0 | Status: DISCONTINUED | OUTPATIENT
Start: 2021-10-29 | End: 2021-11-01

## 2021-10-29 RX ADMIN — PANTOPRAZOLE SODIUM 80 MILLIGRAM(S): 20 TABLET, DELAYED RELEASE ORAL at 21:35

## 2021-10-29 RX ADMIN — ONDANSETRON 4 MILLIGRAM(S): 8 TABLET, FILM COATED ORAL at 21:35

## 2021-10-29 RX ADMIN — SODIUM CHLORIDE 100 MILLILITER(S): 9 INJECTION INTRAMUSCULAR; INTRAVENOUS; SUBCUTANEOUS at 23:43

## 2021-10-29 RX ADMIN — SODIUM CHLORIDE 500 MILLILITER(S): 9 INJECTION INTRAMUSCULAR; INTRAVENOUS; SUBCUTANEOUS at 21:34

## 2021-10-29 RX ADMIN — Medication 650 MILLIGRAM(S): at 21:36

## 2021-10-29 NOTE — ED PROVIDER NOTE - ATTENDING CONTRIBUTION TO CARE
Ilir Cardoso DO:  patient seen and evaluated with the resident.  I was present for key portions of the History & Physical, and I agree with the Impression & Plan. 75 yo f pmh htn, hld, Ilir Cardoso, DO:  patient seen and evaluated with the resident.  I was present for key portions of the History & Physical, and I agree with the Impression & Plan. collateral via ems.  75 yo f pmh htn, hld, on asa 81 mg, pw syncope. pt s/p endoscopy/colonoscopy today. s/p biopsy of two polyps w/ Dr. Connors. at home wanted to eat and then had prodrome of lightheadedness, fatigue, nausea, and then lost consciousness. hit stove. found on floor by family immediately. still on floor w/ ems however conscious. while in triage pt became lightheaded again, found to be hypotensive systolic 60s. by time of arrival in tr-a pt systolic 100. pt mentating well, aaox4, provides complete hx, noted profuse diaphoresis. pt WITHOUT cp, sob, ha. no abd pain. noted two small lacs to posterior scalp, bleeding controlled, will require cleaning and repair. do not suspect acute drop in h/h. possible dehydration given procedure. will check labs, cth, ekg. will contact Dr. Connors for collateral and recs.

## 2021-10-29 NOTE — H&P ADULT - NSHPLABSRESULTS_GEN_ALL_CORE
11.9   11.19 )-----------( 216      ( 29 Oct 2021 19:47 )             37.6     Hgb Trend: 11.9<--  10-29    141  |  100  |  37<H>  ----------------------------<  151<H>  4.6   |  23  |  0.98    Ca    10.3      29 Oct 2021 19:47    TPro  7.4  /  Alb  4.4  /  TBili  0.7  /  DBili  x   /  AST  22  /  ALT  12  /  AlkPhos  49  10-29    Creatinine Trend: 0.98<--  PT/INR - ( 29 Oct 2021 19:47 )   PT: 13.3 sec;   INR: 1.18 ratio         PTT - ( 29 Oct 2021 19:47 )  PTT:29.7 sec          EKG is reviewed by me: NSR with LVH and repolarization changes     < from: CT Head No Cont (10.29.21 @ 19:58) >    FINDINGS:  The ventricles and sulci are prominent, compatible with age-related generalized cerebral volume loss.   There is no CT evidence for acute cerebral cortical infarct. There is no evidence of hemorrhage. There is periventricular and subcortical white matter hypoattenuation,  most compatible with chronic microvascular ischemic changes.   No mass effect is found in the brain.  There is no midline shift or herniation pattern.      The visualized portions of the paranasal sinuses and mastoid air cells are clear.    IMPRESSION:    No evidence of acute intracranial abnormality.  No evidence of hemorrhage.    Chronic changes as above.        < end of copied text >

## 2021-10-29 NOTE — ED PROVIDER NOTE - NS ED ROS FT
General: denies fever, chills, weight loss/weight gain.  HENT: denies nasal congestion, sore throat, rhinorrhea, ear pain.  Eyes: denies visual changes, blurred vision, eye discharge, eye redness.  Neck: denies neck pain, neck swelling.  CV: +syncope; denies chest pain, palpitations.  Resp: denies difficulty breathing, cough.  Abdominal: +nausea, vomiting (now resolved).  MSK: denies muscle aches, bony pain, leg pain, leg swelling.  Neuro: denies headaches, numbness, tingling, dizziness, lightheadedness.  Skin: denies rashes, cuts, bruises.  Hematologic: denies unexplained bruises.

## 2021-10-29 NOTE — ED PROCEDURE NOTE - ATTENDING CONTRIBUTION TO CARE
Ilir Cardoso DO:  patient seen and evaluated with the resident.  I was present for key portions of the History & Physical, and I agree with the Impression & Plan.

## 2021-10-29 NOTE — H&P ADULT - PROBLEM SELECTOR PLAN 3
Pt had episode of hypotension at home as per EMS  -BP is currently on the softer side  -Will hold all anti-hypertensive medications for now

## 2021-10-29 NOTE — ED PROVIDER NOTE - OBJECTIVE STATEMENT
76F w/ EGD/colonoscopy, anemia today at 4:30 p/w syncopal episode.  Pt. went home and felt nauseous.  Proceeded to go eat and felt lightheaded.  Attempted to hold onto the cabinet but fell down, struck posterior aspect of head.  During triage, pt. felt similar sxs and VS were significant for hypotension (60s/40s).  Procedure done by Dr. Juan Manuel Ivan.  Pt. denies any CP, SOB, abd pain, urinary sxs.  Was told she may have some rectal bleeding after procedure.  States she had an episode of vomiting and felt like she threw up a blood clot.

## 2021-10-29 NOTE — ED ADULT NURSE REASSESSMENT NOTE - NS ED NURSE REASSESS COMMENT FT1
Patient resting on stretcher now. VSS. Had one episode of vomiting?old blood. Alert and oriented x4,  safety maintained, Nursing care is ongoing

## 2021-10-29 NOTE — H&P ADULT - PROBLEM SELECTOR PLAN 2
Pt had episode of dark emesis in the ED. Unclear if it was hematochezia   -Hgb stable w/ no further episode of emesis   -Trend CBC q6hrs   -S/p PPI 80 mg IV   -GI consult in the Am if pt continues to have emesis

## 2021-10-29 NOTE — ED ADULT NURSE NOTE - NSIMPLEMENTINTERV_GEN_ALL_ED
Implemented All Fall Risk Interventions:  [place] to call system. Call bell, personal items and telephone within reach. Instruct patient to call for assistance. Room bathroom lighting operational. Non-slip footwear when patient is off stretcher. Physically safe environment: no spills, clutter or unnecessary equipment. Stretcher in lowest position, wheels locked, appropriate side rails in place. Provide visual cue, wrist band, yellow gown, etc. Monitor gait and stability. Monitor for mental status changes and reorient to person, place, and time. Review medications for side effects contributing to fall risk. Reinforce activity limits and safety measures with patient and family.

## 2021-10-29 NOTE — H&P ADULT - NSHPPHYSICALEXAM_GEN_ALL_CORE
Vital Signs Last 24 Hrs  T(C): 36.6 (29 Oct 2021 19:05), Max: 36.8 (29 Oct 2021 14:23)  T(F): 97.9 (29 Oct 2021 19:05), Max: 98.3 (29 Oct 2021 14:23)  HR: 84 (29 Oct 2021 19:05) (83 - 90)  BP: 109/75 (29 Oct 2021 19:05) (103/67 - 142/57)  BP(mean): --  RR: 18 (29 Oct 2021 19:05) (18 - 23)  SpO2: 98% (29 Oct 2021 19:05) (97% - 98%)    GENERAL: NAD, alert and awake, answering questions appropriately  HEENT:  Scalp laceration x3, EOMI, PERRLA, conjunctiva and sclera clear, oral mucosa moist, clear w/o any exudate   NECK: Supple, No JVD  CHEST/LUNG: Clear to auscultation bilaterally; No wheeze  HEART: Systolic murmur; No murmurs, rubs, or gallops  ABDOMEN: Soft, Nontender, Nondistended; Bowel sounds present  EXTREMITIES:  2+ Peripheral Pulses, No clubbing, cyanosis, or edema  PSYCH: AAOx3, pleasant affect  NEUROLOGY: non-focal, cranial nerve 2-12 grossly intact. Strength 5/5 in all extremities. Sensation grossly intact.   SKIN: No rashes or lesions

## 2021-10-29 NOTE — ASU PREOP CHECKLIST - PATIENT'S PERSONAL PROPERTY REMOVED
Please call the [facility] at [phone] if any of your medications change.  This means: if a med is discontinued, a new med is started, or a dose is changed.  These meds may interact with your warfarin and we may need to adjust your dose.  This is especially important if you start any type of ANTIBIOTIC.    Also, please call if you have any admissions to the hospital, visits to an emergency room, procedures planned, or if any other medical provider has instructed you to hold your warfarin.     none

## 2021-10-29 NOTE — ED ADULT NURSE NOTE - OBJECTIVE STATEMENT
Pt received to trauma a a&ox4, ambulatory at baseline c/o dizziness. Pt states that she got a colonoscopy today and felt fine. Pt states that she returned home at 4:30 pm and started to feel very dizzy. Pt states that she became nauseous, and fell. Pt hit her head. Laceration noted to back of head, bleeding controlled. Pt states that her doctor told her to come to the ED to be evaluated. As per triage note, in triage pt began to feel the same symptoms and was hypotensive. On arrival to room, pt states she feels better. Pt denies any dizziness at this time. Pt c/o mild nausea. Pt denies chest pain, shortness of breath, abdominal pain. Pt in no acute distress at this time. Respirations even and unlabored. MD at bedside for eval. Comfort measures provided. Awaiting further orders. Pt received to trauma a a&ox4, ambulatory at baseline c/o dizziness. Pt states that she got a colonoscopy today and felt fine. Pt states that she returned home at 4:30 pm and started to feel very dizzy. Pt states that she became nauseous, and fell. Pt hit her head. Laceration noted to back of head, bleeding controlled. Pt states that her doctor told her to come to the ED to be evaluated. As per triage note, in triage pt began to feel the same symptoms and was hypotensive. On arrival to room, pt states she feels better. Pt denies any dizziness at this time. Pt c/o mild nausea. Pt denies chest pain, shortness of breath, abdominal pain. Pt in no acute distress at this time. Respirations even and unlabored. 18g iv placed in right ac. Blood drawn and sent to lab. MD at bedside for eval. Comfort measures provided. Awaiting further orders.

## 2021-10-29 NOTE — H&P ADULT - NSHPREVIEWOFSYSTEMS_GEN_ALL_CORE
REVIEW OF SYSTEMS:    CONSTITUTIONAL: No weakness, fevers or chills  EYES/ENT: No visual changes;  No vertigo or throat pain   NECK: No pain or stiffness  RESPIRATORY: No cough, wheezing, hemoptysis; No shortness of breath  CARDIOVASCULAR: No chest pain or palpitations  GASTROINTESTINAL: No abdominal or epigastric pain. +nausea; No diarrhea or constipation. No melena or hematochezia.  GENITOURINARY: No dysuria, frequency or hematuria  NEUROLOGICAL: +Dizziness   MUSCULOSKELETAL: No joint pain, no muscle ache   SKIN: No itching, burning, rashes, or lesions   All other review of systems is negative unless indicated above.

## 2021-10-29 NOTE — H&P ADULT - PROBLEM SELECTOR PLAN 1
Patient had an episode of syncope, most likely vasovagal/orthostatic hypotension based on hx and physical exam. Pt had an extensive prep with hypotension at home.   -Check orthostatics   -EKG shows NSR with LVH. trops were elevated but stable now  -Pt has hx of chronic heart murmur. Obtain TTE from outpatient   -Fall precautions

## 2021-10-29 NOTE — H&P ADULT - HISTORY OF PRESENT ILLNESS
76 yo F with PMhx of DMII, HTN, chronic heart murmur, and HLD presents to the ED after a syncopal episode. Pt was feeling nauseous and weak after undergoing colonoscopy and EGD this morning. When she returned home and tried to eat something, she collapsed on the floor. She hit her head on stove and broke the stove glass. She lost consciousness but woke up within couple of seconds. She was able to drag herself to the living room and called her son who then called EMS and . During the initial evaluation by EMS, she was found to have hypotension with SBP as low as 60s. She denies having any prodromal syndrome prior to her fall. She also denies any chest pain, SOB, palpitations, or focal deficits before or after the fall. She did have nausea and had two episodes of dark emesis en route and in the ED. Of note, she had multiple BMs the day before due to extensive prep. She also took her BP medications without eating or drinking anything this morning. She denies any hx of MI. She does have hx of LVH and heart murmur.   In the ED, her BP was on the softer side. Labs were significant for mild leukocytosis and elevated by stable trops. CT head showed no acute bleeding or mass.

## 2021-10-29 NOTE — ED PROVIDER NOTE - PROGRESS NOTE DETAILS
Eros PGY3 - W/u negative, no anemia or intracranial pathology.  Will admit to Dr. Ivan. Ilir Cardoso DO: episode of dark emesis. airway intact, on protonix. accepted to Dr. Jones service.

## 2021-10-29 NOTE — H&P ADULT - ASSESSMENT
78 yo F with PMhx of DMII, HTN, chronic heart murmur, and HLD presents to the ED after a syncopal episode, most likely vasovagal.

## 2021-10-29 NOTE — ED PROVIDER NOTE - CLINICAL SUMMARY MEDICAL DECISION MAKING FREE TEXT BOX
76F p/w syncopal episode after a EGD/colonoscopy earlier today.  Dark black stool noted on the external portion of rectum, VSS in the room but hypotensive in triage.  Multiple scalp wounds in the posterior scalp.  Pt. well appearing now.  Will obtain labs, CT head/c-spine, CXR.  Spoke w/ Dr. Ivan who wants pt. admitted to his service.  Will reassess.

## 2021-10-29 NOTE — ED PROCEDURE NOTE - PROCEDURE ADDITIONAL DETAILS
Two lacerations stapled, with the larger of the two needing three staples and the other one requiring one.  A piece of glass was removed from the smaller wound.  Extensive irrigation was performed and all FB in sight was removed.

## 2021-10-29 NOTE — ED ADULT TRIAGE NOTE - CHIEF COMPLAINT QUOTE
colonoscopy/endoscopy today.  arrived home 4 30 pm. states episode of dizziness,fell to floor. hit head on  stove. states unable to get up from floor. arrives to ed reporting not feeling well,  bp 60/14. placed hob flat to tr a  fs 144

## 2021-10-29 NOTE — ED PROVIDER NOTE - PHYSICAL EXAMINATION
GENERAL: Patient awake alert NAD.  HEENT: NC/AT, Moist mucous membranes.  LUNGS: CTAB, no wheezes or crackles.  CARDIAC: RRR, no m/r/g.  ABDOMEN: Soft, NT, ND, No rebound, guarding. Rectal exam performed w/ Kaitlin Emanuel RN. Dark blood on the external portion of the rectum.  EXT: No edema. No calf tenderness.  MSK: No pain with movement, no deformities.  NEURO: A&Ox3. Moving all extremities.  SKIN: Multiple wounds on posterior scalp, largest ~2cm in diameter.  PSYCH: Normal affect.

## 2021-10-29 NOTE — H&P ADULT - NSHPSOCIALHISTORY_GEN_ALL_CORE
She lives with her grandson. She denies any hx of smoking or alcohol consumption. She is able to perform all of her ADLs.

## 2021-10-30 LAB
A1C WITH ESTIMATED AVERAGE GLUCOSE RESULT: 5.4 % — SIGNIFICANT CHANGE UP (ref 4–5.6)
COVID-19 SPIKE DOMAIN AB INTERP: POSITIVE
COVID-19 SPIKE DOMAIN ANTIBODY RESULT: 227 U/ML — HIGH
ESTIMATED AVERAGE GLUCOSE: 108 — SIGNIFICANT CHANGE UP
GLUCOSE BLDC GLUCOMTR-MCNC: 103 MG/DL — HIGH (ref 70–99)
GLUCOSE BLDC GLUCOMTR-MCNC: 194 MG/DL — HIGH (ref 70–99)
GLUCOSE BLDC GLUCOMTR-MCNC: 217 MG/DL — HIGH (ref 70–99)
GLUCOSE BLDC GLUCOMTR-MCNC: 97 MG/DL — SIGNIFICANT CHANGE UP (ref 70–99)
HCT VFR BLD CALC: 31.5 % — LOW (ref 34.5–45)
HCT VFR BLD CALC: 31.8 % — LOW (ref 34.5–45)
HGB BLD-MCNC: 10.1 G/DL — LOW (ref 11.5–15.5)
HGB BLD-MCNC: 10.5 G/DL — LOW (ref 11.5–15.5)
MCHC RBC-ENTMCNC: 28.9 PG — SIGNIFICANT CHANGE UP (ref 27–34)
MCHC RBC-ENTMCNC: 29.1 PG — SIGNIFICANT CHANGE UP (ref 27–34)
MCHC RBC-ENTMCNC: 32.1 GM/DL — SIGNIFICANT CHANGE UP (ref 32–36)
MCHC RBC-ENTMCNC: 33 GM/DL — SIGNIFICANT CHANGE UP (ref 32–36)
MCV RBC AUTO: 87.6 FL — SIGNIFICANT CHANGE UP (ref 80–100)
MCV RBC AUTO: 90.8 FL — SIGNIFICANT CHANGE UP (ref 80–100)
NRBC # BLD: 0 /100 WBCS — SIGNIFICANT CHANGE UP
NRBC # BLD: 0 /100 WBCS — SIGNIFICANT CHANGE UP
NRBC # FLD: 0 K/UL — SIGNIFICANT CHANGE UP
NRBC # FLD: 0 K/UL — SIGNIFICANT CHANGE UP
PLATELET # BLD AUTO: 136 K/UL — LOW (ref 150–400)
PLATELET # BLD AUTO: 168 K/UL — SIGNIFICANT CHANGE UP (ref 150–400)
RBC # BLD: 3.47 M/UL — LOW (ref 3.8–5.2)
RBC # BLD: 3.63 M/UL — LOW (ref 3.8–5.2)
RBC # FLD: 14.3 % — SIGNIFICANT CHANGE UP (ref 10.3–14.5)
RBC # FLD: 14.5 % — SIGNIFICANT CHANGE UP (ref 10.3–14.5)
SARS-COV-2 IGG+IGM SERPL QL IA: 227 U/ML — HIGH
SARS-COV-2 IGG+IGM SERPL QL IA: POSITIVE
SARS-COV-2 RNA SPEC QL NAA+PROBE: SIGNIFICANT CHANGE UP
WBC # BLD: 10.24 K/UL — SIGNIFICANT CHANGE UP (ref 3.8–10.5)
WBC # BLD: 7.3 K/UL — SIGNIFICANT CHANGE UP (ref 3.8–10.5)
WBC # FLD AUTO: 10.24 K/UL — SIGNIFICANT CHANGE UP (ref 3.8–10.5)
WBC # FLD AUTO: 7.3 K/UL — SIGNIFICANT CHANGE UP (ref 3.8–10.5)

## 2021-10-30 PROCEDURE — 99223 1ST HOSP IP/OBS HIGH 75: CPT

## 2021-10-30 RX ORDER — PANTOPRAZOLE SODIUM 20 MG/1
40 TABLET, DELAYED RELEASE ORAL
Refills: 0 | Status: DISCONTINUED | OUTPATIENT
Start: 2021-10-30 | End: 2021-10-30

## 2021-10-30 RX ORDER — PANTOPRAZOLE SODIUM 20 MG/1
1 TABLET, DELAYED RELEASE ORAL
Qty: 0 | Refills: 0 | DISCHARGE

## 2021-10-30 RX ORDER — PANTOPRAZOLE SODIUM 20 MG/1
40 TABLET, DELAYED RELEASE ORAL
Refills: 0 | Status: DISCONTINUED | OUTPATIENT
Start: 2021-10-30 | End: 2021-11-01

## 2021-10-30 RX ADMIN — Medication 650 MILLIGRAM(S): at 13:40

## 2021-10-30 RX ADMIN — Medication 650 MILLIGRAM(S): at 23:11

## 2021-10-30 RX ADMIN — Medication 650 MILLIGRAM(S): at 04:57

## 2021-10-30 RX ADMIN — SODIUM CHLORIDE 100 MILLILITER(S): 9 INJECTION INTRAMUSCULAR; INTRAVENOUS; SUBCUTANEOUS at 04:54

## 2021-10-30 RX ADMIN — ONDANSETRON 4 MILLIGRAM(S): 8 TABLET, FILM COATED ORAL at 04:58

## 2021-10-30 RX ADMIN — Medication 650 MILLIGRAM(S): at 05:55

## 2021-10-30 RX ADMIN — PANTOPRAZOLE SODIUM 40 MILLIGRAM(S): 20 TABLET, DELAYED RELEASE ORAL at 18:30

## 2021-10-30 RX ADMIN — PANTOPRAZOLE SODIUM 40 MILLIGRAM(S): 20 TABLET, DELAYED RELEASE ORAL at 12:52

## 2021-10-30 RX ADMIN — Medication 1: at 12:55

## 2021-10-30 RX ADMIN — Medication 650 MILLIGRAM(S): at 22:21

## 2021-10-30 RX ADMIN — Medication 30 MILLILITER(S): at 11:48

## 2021-10-30 RX ADMIN — Medication 650 MILLIGRAM(S): at 11:44

## 2021-10-30 NOTE — PHYSICAL THERAPY INITIAL EVALUATION ADULT - ADDITIONAL COMMENTS
Patient lives with grandson in an apartment, +elevator. Patient reports being independent in ADLs and ambulation prior to admission.    Patient was left safely in bed, all lines/tubes intact and call bell within reach, RN aware

## 2021-10-30 NOTE — PHYSICAL THERAPY INITIAL EVALUATION ADULT - PERTINENT HX OF CURRENT PROBLEM, REHAB EVAL
Patient is a 76 year old female admitted to [facility]  after a syncopal episode. PMH: DMII, HTN, chronic heart murmur, and HLD.

## 2021-10-30 NOTE — PHYSICAL THERAPY INITIAL EVALUATION ADULT - PATIENT PROFILE REVIEW, REHAB EVAL
PT orders received: no formal activity order. Consult with MARLA Hdez, pt may participate in PT evaluation./yes

## 2021-10-31 ENCOUNTER — TRANSCRIPTION ENCOUNTER (OUTPATIENT)
Age: 76
End: 2021-10-31

## 2021-10-31 DIAGNOSIS — R55 SYNCOPE AND COLLAPSE: ICD-10-CM

## 2021-10-31 LAB
ANION GAP SERPL CALC-SCNC: 13 MMOL/L — SIGNIFICANT CHANGE UP (ref 7–14)
BUN SERPL-MCNC: 32 MG/DL — HIGH (ref 7–23)
CALCIUM SERPL-MCNC: 10 MG/DL — SIGNIFICANT CHANGE UP (ref 8.4–10.5)
CHLORIDE SERPL-SCNC: 102 MMOL/L — SIGNIFICANT CHANGE UP (ref 98–107)
CO2 SERPL-SCNC: 24 MMOL/L — SIGNIFICANT CHANGE UP (ref 22–31)
CREAT SERPL-MCNC: 0.91 MG/DL — SIGNIFICANT CHANGE UP (ref 0.5–1.3)
GLUCOSE BLDC GLUCOMTR-MCNC: 117 MG/DL — HIGH (ref 70–99)
GLUCOSE BLDC GLUCOMTR-MCNC: 120 MG/DL — HIGH (ref 70–99)
GLUCOSE BLDC GLUCOMTR-MCNC: 127 MG/DL — HIGH (ref 70–99)
GLUCOSE BLDC GLUCOMTR-MCNC: 134 MG/DL — HIGH (ref 70–99)
GLUCOSE SERPL-MCNC: 189 MG/DL — HIGH (ref 70–99)
HCT VFR BLD CALC: 32.8 % — LOW (ref 34.5–45)
HGB BLD-MCNC: 10.4 G/DL — LOW (ref 11.5–15.5)
MCHC RBC-ENTMCNC: 28.9 PG — SIGNIFICANT CHANGE UP (ref 27–34)
MCHC RBC-ENTMCNC: 31.7 GM/DL — LOW (ref 32–36)
MCV RBC AUTO: 91.1 FL — SIGNIFICANT CHANGE UP (ref 80–100)
NRBC # BLD: 0 /100 WBCS — SIGNIFICANT CHANGE UP
NRBC # FLD: 0 K/UL — SIGNIFICANT CHANGE UP
PLATELET # BLD AUTO: 160 K/UL — SIGNIFICANT CHANGE UP (ref 150–400)
POTASSIUM SERPL-MCNC: 4.2 MMOL/L — SIGNIFICANT CHANGE UP (ref 3.5–5.3)
POTASSIUM SERPL-SCNC: 4.2 MMOL/L — SIGNIFICANT CHANGE UP (ref 3.5–5.3)
RBC # BLD: 3.6 M/UL — LOW (ref 3.8–5.2)
RBC # FLD: 14.5 % — SIGNIFICANT CHANGE UP (ref 10.3–14.5)
SODIUM SERPL-SCNC: 139 MMOL/L — SIGNIFICANT CHANGE UP (ref 135–145)
WBC # BLD: 6.68 K/UL — SIGNIFICANT CHANGE UP (ref 3.8–10.5)
WBC # FLD AUTO: 6.68 K/UL — SIGNIFICANT CHANGE UP (ref 3.8–10.5)

## 2021-10-31 PROCEDURE — 73120 X-RAY EXAM OF HAND: CPT | Mod: 26,50

## 2021-10-31 PROCEDURE — 72220 X-RAY EXAM SACRUM TAILBONE: CPT | Mod: 26

## 2021-10-31 RX ADMIN — PANTOPRAZOLE SODIUM 40 MILLIGRAM(S): 20 TABLET, DELAYED RELEASE ORAL at 05:42

## 2021-10-31 RX ADMIN — Medication 650 MILLIGRAM(S): at 12:25

## 2021-10-31 RX ADMIN — PANTOPRAZOLE SODIUM 40 MILLIGRAM(S): 20 TABLET, DELAYED RELEASE ORAL at 18:15

## 2021-10-31 RX ADMIN — Medication 650 MILLIGRAM(S): at 21:32

## 2021-10-31 RX ADMIN — Medication 650 MILLIGRAM(S): at 22:34

## 2021-10-31 RX ADMIN — Medication 650 MILLIGRAM(S): at 11:25

## 2021-10-31 RX ADMIN — Medication 650 MILLIGRAM(S): at 08:44

## 2021-10-31 NOTE — DISCHARGE NOTE PROVIDER - NSDCCPCAREPLAN_GEN_ALL_CORE_FT
PRINCIPAL DISCHARGE DIAGNOSIS  Diagnosis: Vasovagal syncope  Assessment and Plan of Treatment: Follow up with your Primary Care Doctor within 1 week of discharge. Call to schedule an appointment        SECONDARY DISCHARGE DIAGNOSES  Diagnosis: Hematemesis  Assessment and Plan of Treatment: Follow up with your Primary Care Doctor within 1 week of discharge. Call to schedule an appointment      Diagnosis: Type 2 diabetes mellitus  Assessment and Plan of Treatment: Continue your medication regimen and a consistent carbohydrate diet (Meaning eating the same amount of carbohydrates at the same time each day). Monitor blood glucose levels throughout the day before meals and at bedtime. Record blood sugars and bring to outpatient providers appointment in order to be reviewed by your doctor for management modifications. If your sugars are more than 400 or less than 70 you should contact your PCP immediately. Monitor for signs/symptoms of low blood glucose, such as, dizziness, altered mental status, or cool/clammy skin. In addition, monitor for signs/symptoms of high blood glucose, such as, feeling hot, dry, fatigued, or with increased thirst/urination. Make regular podiatry appointments in order to have feet checked for wounds and uncontrolled toe nail growth to prevent infections, as well as, appointments with an ophthalmologist to monitor your vision.  Follow up with your Primary Care Doctor within 1 week of discharge. Call to schedule an appointment      Diagnosis: Hyperlipidemia  Assessment and Plan of Treatment: continue Atorvastatin  Follow up with your Primary Care Doctor within 1 week of discharge. Call to schedule an appointment      Diagnosis: Essential hypertension  Assessment and Plan of Treatment: Continue blood pressure medication regimen as directed. Monitor for any visual changes, headaches or dizziness.  Monitor blood pressure regularly.  Follow up with your primary care provider for further management for high blood pressure.  Follow up with your Primary Care Doctor within 1 week of discharge. Call to schedule an appointment     PRINCIPAL DISCHARGE DIAGNOSIS  Diagnosis: Vasovagal syncope  Assessment and Plan of Treatment: Likely secondary to dehydration in setting of NPO status for EGD/colonoscopy. Your B/L hand images were negative for fractures or evidence of glass. Your sacral xray could not rule outa  coccyx fracture. It was recommended to follow up with CT scan. Follow up with Dr. Ivan on Wednesday, call his office for appointment time.      SECONDARY DISCHARGE DIAGNOSES  Diagnosis: Essential hypertension  Assessment and Plan of Treatment: Continue blood pressure medication regimen as directed. Monitor for any visual changes, headaches or dizziness.  Monitor blood pressure regularly.  Follow up with your primary care provider for further management for high blood pressure.  Follow up with your Primary Care Doctor within 1 week of discharge. Call to schedule an appointment    Diagnosis: Hematemesis  Assessment and Plan of Treatment: You underwent EGD/colonoscopy: which showed hemorrhoids and rectal prolaspe (intestine prodrudes through anus), however this spontaneously resolved. You also have diverticulosis (small bulging pocuhes of the digestive tract). Two polyps were removed and biopsed.   Follow up with Dr. Ivan for polyp biopsy. Continue with protonix.       Diagnosis: Type 2 diabetes mellitus  Assessment and Plan of Treatment: Continue your medication regimen and a consistent carbohydrate diet (Meaning eating the same amount of carbohydrates at the same time each day). Monitor blood glucose levels throughout the day before meals and at bedtime. Record blood sugars and bring to outpatient providers appointment in order to be reviewed by your doctor for management modifications. If your sugars are more than 400 or less than 70 you should contact your PCP immediately. Monitor for signs/symptoms of low blood glucose, such as, dizziness, altered mental status, or cool/clammy skin. In addition, monitor for signs/symptoms of high blood glucose, such as, feeling hot, dry, fatigued, or with increased thirst/urination. Make regular podiatry appointments in order to have feet checked for wounds and uncontrolled toe nail growth to prevent infections, as well as, appointments with an ophthalmologist to monitor your vision.  Follow up with your Primary Care Doctor within 1 week of discharge. Call to schedule an appointment      Diagnosis: Hyperlipidemia  Assessment and Plan of Treatment: continue Atorvastatin  Follow up with your Primary Care Doctor within 1 week of discharge. Call to schedule an appointment       PRINCIPAL DISCHARGE DIAGNOSIS  Diagnosis: Vasovagal syncope  Assessment and Plan of Treatment: Likely secondary to dehydration in setting of NPO status for EGD/colonoscopy. Your B/L hand images were negative for fractures or evidence of glass. Your sacral xray did not show a definitive fracture. Follow up with Dr. Ivan on Wednesday, call his office for appointment time.      SECONDARY DISCHARGE DIAGNOSES  Diagnosis: Essential hypertension  Assessment and Plan of Treatment: Continue blood pressure medication regimen as directed, this medication was adjusted: for now only continue with your metoprolo lat night time. Dr. Ivan will resume your other blood pressure medications as an out patient if needed. Monitor for any visual changes, headaches or dizziness.  Monitor blood pressure regularly.  Follow up with your primary care provider for further management for high blood pressure.  Follow up with your Primary Care Doctor within 1 week of discharge. Call to schedule an appointment    Diagnosis: Hematemesis  Assessment and Plan of Treatment: You underwent EGD/colonoscopy: which showed hemorrhoids and rectal prolaspe (intestine prodrudes through anus), however this spontaneously resolved. You also have diverticulosis (small bulging pocuhes of the digestive tract). Two polyps were removed and biopsed.   Follow up with Dr. Ivan for polyp biopsy. Continue with protonix two times per day. Your aspirin has been discontinued.      Diagnosis: Type 2 diabetes mellitus  Assessment and Plan of Treatment: Your A1c is 5.4, thus your metformin has been discontinued. Continue your medication regimen and a consistent carbohydrate diet (Meaning eating the same amount of carbohydrates at the same time each day). Monitor blood glucose levels throughout the day before meals and at bedtime. Record blood sugars and bring to outpatient providers appointment in order to be reviewed by your doctor for management modifications. If your sugars are more than 400 or less than 70 you should contact your PCP immediately. Monitor for signs/symptoms of low blood glucose, such as, dizziness, altered mental status, or cool/clammy skin. In addition, monitor for signs/symptoms of high blood glucose, such as, feeling hot, dry, fatigued, or with increased thirst/urination. Make regular podiatry appointments in order to have feet checked for wounds and uncontrolled toe nail growth to prevent infections, as well as, appointments with an ophthalmologist to monitor your vision.  Follow up with your Primary Care Doctor within 1 week of discharge. Call to schedule an appointment      Diagnosis: Hyperlipidemia  Assessment and Plan of Treatment: continue Atorvastatin  Follow up with your Primary Care Doctor within 1 week of discharge. Call to schedule an appointment

## 2021-10-31 NOTE — DISCHARGE NOTE PROVIDER - HOSPITAL COURSE
76 yo F with PMhx of DMII, HTN, chronic heart murmur, and HLD presents to the ED after a syncopal episode post EGD/colonoscopy, most likely vasovagal.     Vasovagal syncope.   -Patient had an episode of syncope, most likely vasovagal/orthostatic hypotension based on hx and physical exam. Pt had an extensive prep with hypotension at home.   -EKG shows NSR with LVH. trops were elevated but stable now  -Pt has hx of chronic heart murmur.   -Fall precautions.  -Mild Orthostasis     Hematemesis.   -Pt had episode of dark emesis in the ED. Unclear if it was hematochezia   -Hgb stable w/ no further episode of emesis   -Trend CBC q6hrs   -S/p PPI 80 mg IV -on PPI oral BID  -Pt s/p EGD/Colonoscopy outpt 10/29, outpt f/u with GI    Essential hypertension.   -Pt had episode of hypotension at home as per EMS  -BP currently on the softer side  -All  anti-hypertensive medications held    Type 2 diabetes mellitus.   -C/w ISS and FS.  -HgbA1c 5.4    Hyperlipidemia.   -C/w atorvastatin.   78 yo F with PMhx of DMII, HTN, chronic heart murmur, and HLD presents to the ED after a syncopal episode post EGD/colonoscopy, most likely vasovagal.     Vasovagal syncope.   -Patient had an episode of syncope, most likely vasovagal/orthostatic hypotension based on hx and physical exam. Pt had an extensive prep with hypotension at home.   -EKG shows NSR with LVH. trops were elevated but stable now  -Pt has hx of chronic heart murmur.   -Mild Orthostasis   -B/L hand xray:There is no acute fracture or dislocation. 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 There is mild mineralization volar to the basilar joint. There is mild radial deviation of the first distal phalanx. No radiopaque foreign bodies identified.  -Sacrum/coccyx xray: There is a grade 1 anterolisthesis of L4 on L5 and L5 on S1. There is severe disc height loss at L4-5. There is lower lumbar facet arthropathy. Question an age-indeterminate obliquely oriented fracture at a proximal coccygeal segment. CT scan can be performed for further evaluation if clinically warranted. --> Out patient follow up with CT scan.    Hematemesis.   -Pt had episode of dark emesis in the ED. Unclear if it was hematochezia   -Hgb stable w/ no further episode of emesis   -Trend CBC q6hrs   -S/p PPI 80 mg IV -on PPI oral BID  -Pt s/p EGD/Colonoscopy outpt 10/29, outpt f/u with GI    Essential hypertension.   -Pt had episode of hypotension at home as per EMS  -BP currently on the softer side  -All  anti-hypertensive medications held    Type 2 diabetes mellitus.   -C/w ISS and FS.  -HgbA1c 5.4    Hyperlipidemia.   -C/w atorvastatin.    Pt medically stable for discharge on 11/1 as per discussion with Dr. Ivan.   Dispo: home with out patient PT    76 yo F with PMhx of DMII, HTN, chronic heart murmur, and HLD presents to the ED after a syncopal episode post EGD/colonoscopy, most likely vasovagal.     Vasovagal syncope.   -Patient had an episode of syncope, most likely vasovagal/orthostatic hypotension based on hx and physical exam. Pt had an extensive prep with hypotension at home.   -EKG shows NSR with LVH. trops were elevated but stable now  -Pt has hx of chronic heart murmur.   -Mild Orthostasis   -B/L hand xray:There is no acute fracture or dislocation. 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 There is mild mineralization volar to the basilar joint. There is mild radial deviation of the first distal phalanx. No radiopaque foreign bodies identified.  -Sacrum/coccyx xray: There is a grade 1 anterolisthesis of L4 on L5 and L5 on S1. There is severe disc height loss at L4-5. There is lower lumbar facet arthropathy. Question an age-indeterminate obliquely oriented fracture at a proximal coccygeal segment. CT scan can be performed for further evaluation if clinically warranted. --> Out patient follow up with CT scan.    Hematemesis.   -Pt had episode of dark emesis in the ED. Unclear if it was hematochezia   -Hgb stable w/ no further episode of emesis   -Trend CBC q6hrs   -S/p PPI 80 mg IV -on PPI oral BID  -Pt s/p EGD/Colonoscopy outpt 10/29, outpt f/u with GI    Essential hypertension.   -Pt had episode of hypotension at home as per EMS  -BP currently on the softer side  -All  anti-hypertensive medications held    Type 2 diabetes mellitus.   -C/w ISS and FS.  -HgbA1c 5.4    Hyperlipidemia.   -C/w atorvastatin.    Pt medically stable for discharge on 11/1 as per discussion with Dr. Ivna.   DC medications discussed with Dr. Ivan: will stop metformin, stop asa and c/w protonix, only metoprolol QHS (stop am metoprolol) and stop diovan. Pt aware of these changes and to f/u w/ Dr Ivan on Wedneday,  Dispo: home with out patient PT

## 2021-10-31 NOTE — CHART NOTE - NSCHARTNOTEFT_GEN_A_CORE
Pt s/p syncope with fall at home, concern for residual glass shards in both palms of hands, also c/o coccyx pain, awaiting Xray B/L hands, Xray Coccyx as requested by Dr. Ivan.

## 2021-10-31 NOTE — DISCHARGE NOTE PROVIDER - NSDCMRMEDTOKEN_GEN_ALL_CORE_FT
aspirin 81 mg oral delayed release tablet: 1 tab(s) orally once a day  metFORMIN 500 mg oral tablet, extended release: 1 tab(s) orally 2 times a day  RESTART 7/14  metoprolol succinate 100 mg oral tablet, extended release: 1 tab(s) orally once a day (in the morning)   metoprolol succinate 50 mg oral tablet, extended release: 1 tab(s) orally in evening  Protonix 40 mg oral delayed release tablet: 1 tab(s) orally 2 times a day  rosuvastatin 20 mg oral tablet: 1 tab(s) orally once a day  Tylenol 500 mg oral tablet: 2 tab(s) orally every 8 hours, As Needed  valsartan-hydrochlorothiazide 160mg-25mg oral tablet: 1 tab(s) orally once a day   metoprolol succinate 50 mg oral tablet, extended release: 1 tab(s) orally in evening  Out patient physical therapy: 3x/week  Protonix 40 mg oral delayed release tablet: 1 tab(s) orally 2 times a day  rosuvastatin 20 mg oral tablet: 1 tab(s) orally once a day  Tylenol 500 mg oral tablet: 2 tab(s) orally every 8 hours, As Needed

## 2021-11-01 ENCOUNTER — TRANSCRIPTION ENCOUNTER (OUTPATIENT)
Age: 76
End: 2021-11-01

## 2021-11-01 VITALS
DIASTOLIC BLOOD PRESSURE: 75 MMHG | SYSTOLIC BLOOD PRESSURE: 131 MMHG | HEART RATE: 100 BPM | TEMPERATURE: 98 F | OXYGEN SATURATION: 99 % | RESPIRATION RATE: 16 BRPM

## 2021-11-01 LAB
ANION GAP SERPL CALC-SCNC: 13 MMOL/L — SIGNIFICANT CHANGE UP (ref 7–14)
BUN SERPL-MCNC: 28 MG/DL — HIGH (ref 7–23)
CALCIUM SERPL-MCNC: 10.3 MG/DL — SIGNIFICANT CHANGE UP (ref 8.4–10.5)
CHLORIDE SERPL-SCNC: 103 MMOL/L — SIGNIFICANT CHANGE UP (ref 98–107)
CO2 SERPL-SCNC: 24 MMOL/L — SIGNIFICANT CHANGE UP (ref 22–31)
CREAT SERPL-MCNC: 0.78 MG/DL — SIGNIFICANT CHANGE UP (ref 0.5–1.3)
GLUCOSE BLDC GLUCOMTR-MCNC: 114 MG/DL — HIGH (ref 70–99)
GLUCOSE BLDC GLUCOMTR-MCNC: 126 MG/DL — HIGH (ref 70–99)
GLUCOSE SERPL-MCNC: 145 MG/DL — HIGH (ref 70–99)
HCT VFR BLD CALC: 31.3 % — LOW (ref 34.5–45)
HGB BLD-MCNC: 10.3 G/DL — LOW (ref 11.5–15.5)
MCHC RBC-ENTMCNC: 29.3 PG — SIGNIFICANT CHANGE UP (ref 27–34)
MCHC RBC-ENTMCNC: 32.9 GM/DL — SIGNIFICANT CHANGE UP (ref 32–36)
MCV RBC AUTO: 88.9 FL — SIGNIFICANT CHANGE UP (ref 80–100)
NRBC # BLD: 0 /100 WBCS — SIGNIFICANT CHANGE UP
NRBC # FLD: 0 K/UL — SIGNIFICANT CHANGE UP
PLATELET # BLD AUTO: 133 K/UL — LOW (ref 150–400)
POTASSIUM SERPL-MCNC: 4.2 MMOL/L — SIGNIFICANT CHANGE UP (ref 3.5–5.3)
POTASSIUM SERPL-SCNC: 4.2 MMOL/L — SIGNIFICANT CHANGE UP (ref 3.5–5.3)
RBC # BLD: 3.52 M/UL — LOW (ref 3.8–5.2)
RBC # FLD: 14.8 % — HIGH (ref 10.3–14.5)
SODIUM SERPL-SCNC: 140 MMOL/L — SIGNIFICANT CHANGE UP (ref 135–145)
WBC # BLD: 4.97 K/UL — SIGNIFICANT CHANGE UP (ref 3.8–10.5)
WBC # FLD AUTO: 4.97 K/UL — SIGNIFICANT CHANGE UP (ref 3.8–10.5)

## 2021-11-01 RX ORDER — METFORMIN HYDROCHLORIDE 850 MG/1
1 TABLET ORAL
Qty: 0 | Refills: 0 | DISCHARGE

## 2021-11-01 RX ORDER — ASPIRIN/CALCIUM CARB/MAGNESIUM 324 MG
1 TABLET ORAL
Qty: 0 | Refills: 0 | DISCHARGE

## 2021-11-01 RX ADMIN — Medication 650 MILLIGRAM(S): at 07:16

## 2021-11-01 RX ADMIN — PANTOPRAZOLE SODIUM 40 MILLIGRAM(S): 20 TABLET, DELAYED RELEASE ORAL at 06:31

## 2021-11-01 RX ADMIN — Medication 650 MILLIGRAM(S): at 06:31

## 2021-11-01 RX ADMIN — Medication 650 MILLIGRAM(S): at 14:32

## 2021-11-01 NOTE — DISCHARGE NOTE NURSING/CASE MANAGEMENT/SOCIAL WORK - PATIENT PORTAL LINK FT
You can access the FollowMyHealth Patient Portal offered by [facility] by registering at the following website: [email or web address]. By joining [facility]’s FollowMyHealth portal, you will also be able to view your health information using other applications (apps) compatible with our system.

## 2021-11-01 NOTE — PROGRESS NOTE ADULT - REASON FOR ADMISSION
Syncope gi bleed
Syncope gi bleeding glass in hand ?
syncope  rule out gi bleed cardiomyopathy
Syncope gi bleeding

## 2021-11-01 NOTE — PROGRESS NOTE ADULT - ASSESSMENT
syncopal episode after endoscopy colonoscopy has not eaten in 48 hours had cetacaine spray biopsies possible gi bleeding hb stable but must follow  iv fluids iv ppis zofran prn nausea start liquid diet  ct head spine normal laceratio back of head stapled cbc  stool checks daily  sliding scale coverage of glucose   repeat cbc  hb 10.4  hct 32.5   daily cbcs advance diet as tolerated  hb yesterday unchanged  for xrays of hand 
syncopal episode after endoscopy colonoscopy has not eaten in 48 hours had cetacaine spray biopsies possible gi bleeding hb stable but must follow  iv fluids iv ppis zofran prn nausea start liquid diet  ct head spine normal laceratio back of head stapled cbc  stool checks daily  sliding scale coverage of glucose 
syncopal episode after endoscopy colonoscopy has not eaten in 48 hours had cetacaine spray biopsies possible gi bleeding hb stable but must follow  iv fluids iv ppis zofran prn nausea start liquid diet  ct head spine normal laceratio back of head stapled cbc  stool checks daily  sliding scale coverage of glucose   repeat cbc  hb 10.5  hct 31.8   daily cbcs advance diet as tolerated 
syncopal episode after endoscopy colonoscopy has not eaten in 48 hours had cetacaine spray biopsies possible gi bleeding hb stable but must follow  iv fluids iv ppis zofran prn nausea start liquid diet  ct head spine normal laceratio back of head stapled cbc  stool checks daily  sliding scale coverage of glucose   repeat cbc  hb 10.5  hct 31.8   daily cbcs advance diet as tolerated  hb yesterday unchanged

## 2021-11-02 LAB — SURGICAL PATHOLOGY STUDY: SIGNIFICANT CHANGE UP

## 2021-12-29 ENCOUNTER — APPOINTMENT (OUTPATIENT)
Dept: CARDIOLOGY | Facility: CLINIC | Age: 76
End: 2021-12-29
Payer: MEDICARE

## 2021-12-29 VITALS
OXYGEN SATURATION: 97 % | DIASTOLIC BLOOD PRESSURE: 80 MMHG | SYSTOLIC BLOOD PRESSURE: 130 MMHG | WEIGHT: 186 LBS | HEART RATE: 63 BPM | BODY MASS INDEX: 36.33 KG/M2

## 2021-12-29 PROCEDURE — 99214 OFFICE O/P EST MOD 30 MIN: CPT

## 2021-12-29 NOTE — DISCUSSION/SUMMARY
[FreeTextEntry1] : 1.  Patient will continue on her present regimen for hypertrophic cardiomyopathy.  She is clinically stable hemodynamically stable and free of symptoms of shortness of breath\par \par 2.  Follow-up with a hemoglobin hematocrit with Dr. Ivan\par \par 3.  Follow-up with Dr. Perez her internist\par \par 4.  Follow-up with me again in 3 months

## 2021-12-29 NOTE — HISTORY OF PRESENT ILLNESS
[FreeTextEntry1] : Roberta is 75 years old with hypertension well-controlled diabetes type 2 well-controlled hyperlipidemia on statin, elevated BMI (39) and a heart murmur.  She had a CT coronary angiogram many years ago which did reveal nonobstructive coronary disease.  She has been diagnosed with a hypertrophic cardiomyopathy with some calcification of the aortic valve\par \par She is a past history of uterine carcinoma many years ago no recurrence\par \par She denies chest pain chest pressure shortness of breath.  She is active but does not do formal exercise such as vigorous walking.  She denies orthopnea PND palpitations dizziness or syncope.\par \par Noninvasive cardiac evaluation\par EKG normal sinus rhythm left ventricular hypertrophy with repolarization unchanged\par 2D echo 2/18/2020 moderate concentric left ventricular hypertrophy normal left ventricular systolic function grade 1 diastolic dysfunction with impaired relaxation idiopathic hypertrophic subaortic stenosis with systolic anterior motion of the mitral valve severe mitral annular calcification mild pulmonary hypertension\par \par Carotid Doppler revealed some moderate intimal thickening in the right common carotid artery less than 50% stenosis in the right bulb the left common carotid artery had no significant atherosclerotic plaquing and no significant stenosis\par \par Since last visit she has had some increasing shortness of breath with exertion.  She has no exertional chest pain or exertional dizziness\par \par A repeat echocardiogram again revealed mild concentric left ventricle hypertrophy with basal septal hypertrophy left ventricular outflow tract gradient of 59 mm not noted on the prior echo mild to moderate mitral valve regurgitation systolic anterior motion of the anterior leaflet of the mitral valve.  There appears to be some degree of aortic stenosis but cannot be accurately assessed because of the LVOT obstruction\par \par Then she had an episode of shortness of breath with a moderate amount of exertion which was more than she has had in the past..  She has no edema orthopnea PND\par \par EKG reveals normal sinus rhythm left ventricle hypertrophy with repolarization\par \par CT coronary angiogram was nonconclusive but suggested the possibility of significant stenosis\par \par Subsequent to this she underwent cardiac catheterization which revealed nonobstructive coronary disease no significant stenosis, hyperdynamic left ventricle with a resting gradient which increased significantly with provocation such as PVC post PVC and Valsalva\par \par Patient had been complaining of increasing shortness of breath.  We increased her beta-blocker to 100 in the morning and 50 at night.  She has lost 6 pounds.  Overall she is feeling better with less shortness of breath she is able to do more activity.  She has no chest pain palpitations dizziness or syncope\par \par Patient remains on valsartan 160/12.5.  She is now on Toprol- in the a.m. and 50 in the p.m. along with her other medications for hyperlipidemia and diabetes\par \par She continues to feel better with less shortness of breath and is tolerating 150 mg of Toprol.  She has no chest pain dizziness or syncope.  She denies palpitations\par \par Patient had been doing extremely well with less shortness of breath, she lost about 8 to 10 pounds.\par In October she went for routine endoscopy.  She did have a biopsy.  She went home and standing at the refrigerator she suddenly fell back felt dizzy and passed out hit her head.  She was taken to the emergency room at [facility] where she was found to be quite anemic received blood transfusion at [facility] to her head otherwise work-up was unremarkable.\par \par She has been followed very carefully by Dr. Ivan and her blood count has increased.  She is now on iron in addition to her other medications.  She did have shortness of breath but this now has resolved and she is feeling well without chest pain exertional dizziness exertional chest pain exertional shortness of breath.  She is very motivated to continue to lose weight.\par

## 2022-03-30 ENCOUNTER — APPOINTMENT (OUTPATIENT)
Dept: CARDIOLOGY | Facility: CLINIC | Age: 77
End: 2022-03-30

## 2022-04-01 ENCOUNTER — APPOINTMENT (OUTPATIENT)
Dept: CARDIOLOGY | Facility: CLINIC | Age: 77
End: 2022-04-01
Payer: MEDICARE

## 2022-04-01 VITALS
HEART RATE: 81 BPM | WEIGHT: 188 LBS | HEIGHT: 60 IN | OXYGEN SATURATION: 96 % | SYSTOLIC BLOOD PRESSURE: 170 MMHG | DIASTOLIC BLOOD PRESSURE: 80 MMHG | BODY MASS INDEX: 36.91 KG/M2

## 2022-04-01 PROCEDURE — 99214 OFFICE O/P EST MOD 30 MIN: CPT

## 2022-04-01 NOTE — HISTORY OF PRESENT ILLNESS
[FreeTextEntry1] : Roberta is 75 years old with hypertension well-controlled diabetes type 2 well-controlled hyperlipidemia on statin, elevated BMI (39) and a heart murmur.  She had a CT coronary angiogram many years ago which did reveal nonobstructive coronary disease.  She has been diagnosed with a hypertrophic cardiomyopathy with some calcification of the aortic valve\par \par She is a past history of uterine carcinoma many years ago no recurrence\par \par She denies chest pain chest pressure shortness of breath.  She is active but does not do formal exercise such as vigorous walking.  She denies orthopnea PND palpitations dizziness or syncope.\par \par Noninvasive cardiac evaluation\par EKG normal sinus rhythm left ventricular hypertrophy with repolarization unchanged\par 2D echo 2/18/2020 moderate concentric left ventricular hypertrophy normal left ventricular systolic function grade 1 diastolic dysfunction with impaired relaxation idiopathic hypertrophic subaortic stenosis with systolic anterior motion of the mitral valve severe mitral annular calcification mild pulmonary hypertension\par \par Carotid Doppler revealed some moderate intimal thickening in the right common carotid artery less than 50% stenosis in the right bulb the left common carotid artery had no significant atherosclerotic plaquing and no significant stenosis\par \par \par \par A repeat June 2021 echocardiogram again revealed mild concentric left ventricle hypertrophy with basal septal hypertrophy left ventricular outflow tract gradient of 59 mm not noted on the prior echo mild to moderate mitral valve regurgitation systolic anterior motion of the anterior leaflet of the mitral valve.  There appears to be some degree of aortic stenosis but cannot be accurately assessed because of the LVOT obstruction\par \par We had increased her Toprol-XL to 100 twice a day.  She has lost a few pounds and overall is feeling well since the last visit several months ago.  She has no exertional chest pain exertional shortness of breath or exertional dizziness\par \par Then she had an episode of shortness of breath with a moderate amount of exertion which was more than she has had in the past..  She has no edema orthopnea PND\par \par EKG reveals normal sinus rhythm left ventricle hypertrophy with repolarization\par \par CT coronary angiogram was nonconclusive but suggested the possibility of significant stenosis\par \par Subsequent to this she underwent cardiac catheterization which revealed nonobstructive coronary disease no significant stenosis, hyperdynamic left ventricle with a resting gradient which increased significantly with provocation such as PVC post PVC and Valsalva\par \par Patient had been complaining of increasing shortness of breath.  We increased her beta-blocker to 100 in the morning and 50 at night.  She has lost 6 pounds.  Overall she is feeling better with less shortness of breath she is able to do more activity.  She has no chest pain palpitations dizziness or syncope\par \par Patient remains on valsartan 160/12.5.  She is now on Toprol- in the a.m. and 100 in the p.m. along with her other medications for hyperlipidemia and diabetes\par \par She continues to feel better with less shortness of breath and is tolerating 200 mg of Toprol.  She has no chest pain dizziness or syncope.  She denies palpitations\par \par \par \par Patient had been doing extremely well with less shortness of breath, she lost about 8 to 10 pounds.\par In October she went for routine endoscopy.  She did have a biopsy.  She went home and standing at the refrigerator she suddenly fell back felt dizzy and passed out hit her head.  She was taken to the emergency room at [facility] where she was found to be quite anemic received blood transfusion at [employer] to her head otherwise work-up was unremarkable.\par \par She has been followed very carefully by Dr. Ivan and her blood count has increased.  She is now on iron in addition to her other medications.  She did have shortness of breath but this now has resolved and she is feeling well without chest pain exertional dizziness exertional chest pain exertional shortness of breath.  She is very motivated to continue to lose weight.\par \par Post recent endoscopy she had an episode of syncope when she came home and was found to be anemic probably fluid depleted.  The symptom has not recurred\par \par Patient has been stable from a cardiac point of view has lost a few pounds.  She has no dizziness or syncope no exertional chest pain or chest pressure.\par \par Recent echocardiogram by Dr. Valdez March 2022 revealed hypertrophic left ventricle with systolic anterior motion with a subaortic gradient moderate calcification of the aortic valve.  They were unable to assess the severity of the aortic stenosis\par \par On her cardiac catheterization that I reviewed from approximately 6 months ago she had mild calcification of the aortic valve the valve was easy to cross and most of the gradient almost all the gradient was subaortic\par \par

## 2022-04-01 NOTE — DISCUSSION/SUMMARY
[FreeTextEntry1] : Patient will continue on her present regimen of medications.  I urged her to be more active and to continue to lose weight which will be helpful for her blood pressure.  Her shortness of breath has improved she is relatively asymptomatic.\par \par Routine follow-up with me again in 4 months.  Repeat echocardiogram on a yearly basis

## 2022-04-01 NOTE — ASSESSMENT
[FreeTextEntry1] : Roberta Mckenna has diabetes hypertension hyperlipidemia all under good control.  She has evidence of atherosclerosis and coronary disease on CT coronary angiogram nonobstructive from several years ago.  She is asymptomatic without chest pain chest pressure shortness of breath she is overweight but is making great attempts at losing weight.  She is on appropriate medications for hypertension and diabetes.  Recent cardiac catheterization revealed nonobstructive coronary disease with a hypertrophic cardiomyopathy with a dynamic and resting gradient.  Patient had been doing extremely well until a recent episode of GI bleed after an endoscopy but is now improved.  Blood pressure today was slightly elevated but eventually came down to a relatively normal level of 140/80\par \par 1.  Hypertrophic cardiomyopathy with resting and provocable gradient now improved symptoms of shortness of breath on 100 of metoprolol in the morning and 100 at night.  She is free of significant shortness of breath or chest pain.  I do not believe the aortic stenosis is significant at this point but needs to be watched carefully\par \par 2.  Hyperlipidemia on statin cholesterol 163 triglycerides 129 HDL 76 LDL 61 at goal\par \par 3.  Diabetes type 2 on oral agents recent hemoglobin A1c 5.9\par \par 4.  Elevated BMI patient has lost 10 pounds since last visit\par \par 5.  Nonobstructive coronary disease\par \par 6.  Status post syncopal episode secondary to GI bleed after endoscopy which is now resolved and her symptoms of shortness of breath fatigue have also resolved.

## 2022-05-10 ENCOUNTER — RX RENEWAL (OUTPATIENT)
Age: 77
End: 2022-05-10

## 2022-07-08 ENCOUNTER — APPOINTMENT (OUTPATIENT)
Dept: CARDIOLOGY | Facility: CLINIC | Age: 77
End: 2022-07-08

## 2022-07-08 VITALS
HEIGHT: 60 IN | OXYGEN SATURATION: 96 % | DIASTOLIC BLOOD PRESSURE: 68 MMHG | BODY MASS INDEX: 36.32 KG/M2 | SYSTOLIC BLOOD PRESSURE: 128 MMHG | HEART RATE: 62 BPM | WEIGHT: 185 LBS

## 2022-07-08 DIAGNOSIS — R42 DIZZINESS AND GIDDINESS: ICD-10-CM

## 2022-07-08 PROCEDURE — 99214 OFFICE O/P EST MOD 30 MIN: CPT

## 2022-07-08 RX ORDER — METFORMIN ER 500 MG 500 MG/1
500 TABLET ORAL
Qty: 180 | Refills: 0 | Status: ACTIVE | COMMUNITY
Start: 2021-12-29

## 2022-07-08 RX ORDER — PANTOPRAZOLE 40 MG/1
40 TABLET, DELAYED RELEASE ORAL
Qty: 90 | Refills: 0 | Status: ACTIVE | COMMUNITY
Start: 2022-01-24

## 2022-07-08 NOTE — REASON FOR VISIT
[FreeTextEntry1] : Roberta Mckenna with a hypertrophic cardiomyopathy  underwent catheterization which revealed nonobstructive coronary disease and some resting gradient with a hyperdynamic ventricle which increased with provocative maneuvers.  She is here for follow-up

## 2022-07-08 NOTE — HISTORY OF PRESENT ILLNESS
[FreeTextEntry1] : Roberta is 75 years old with hypertension well-controlled diabetes type 2 well-controlled hyperlipidemia on statin, elevated BMI (39) and a heart murmur.  She had a CT coronary angiogram many years ago which did reveal nonobstructive coronary disease.  She has been diagnosed with a hypertrophic cardiomyopathy with some calcification of the aortic valve\par \par She is a past history of uterine carcinoma many years ago no recurrence\par \par She denies chest pain chest pressure shortness of breath.  She is active but does not do formal exercise such as vigorous walking.  She denies orthopnea PND palpitations dizziness or syncope.\par \par Noninvasive cardiac evaluation\par EKG normal sinus rhythm left ventricular hypertrophy with repolarization unchanged\par 2D echo 2/18/2020 moderate concentric left ventricular hypertrophy normal left ventricular systolic function grade 1 diastolic dysfunction with impaired relaxation idiopathic hypertrophic subaortic stenosis with systolic anterior motion of the mitral valve severe mitral annular calcification mild pulmonary hypertension\par \par Carotid Doppler revealed some moderate intimal thickening in the right common carotid artery less than 50% stenosis in the right bulb the left common carotid artery had no significant atherosclerotic plaquing and no significant stenosis\par \par \par \par A repeat June 2021 echocardiogram again revealed mild concentric left ventricle hypertrophy with basal septal hypertrophy left ventricular outflow tract gradient of 59 mm not noted on the prior echo mild to moderate mitral valve regurgitation systolic anterior motion of the anterior leaflet of the mitral valve.  There appears to be some degree of aortic stenosis but cannot be accurately assessed because of the LVOT obstruction\par \par We had increased her Toprol-XL to 100 twice a day.  She has lost a few pounds and overall is feeling well since the last visit several months ago.  She has no exertional chest pain exertional shortness of breath or exertional dizziness\par \par Then she had an episode of shortness of breath with a moderate amount of exertion which was more than she has had in the past..  She has no edema orthopnea PND\par \par EKG reveals normal sinus rhythm left ventricle hypertrophy with repolarization\par \par CT coronary angiogram was nonconclusive but suggested the possibility of significant stenosis\par \par Subsequent to this she underwent cardiac catheterization which revealed nonobstructive coronary disease no significant stenosis, hyperdynamic left ventricle with a resting gradient which increased significantly with provocation such as PVC post PVC and Valsalva\par \par Patient had been complaining of increasing shortness of breath.  We increased her beta-blocker to 100 in the morning and 50 at night.  She has lost 6 pounds.  Overall she is feeling better with less shortness of breath she is able to do more activity.  She has no chest pain palpitations dizziness or syncope\par \par Patient remains on valsartan 160/12.5.  She is now on Toprol- in the a.m. and 100 in the p.m. along with her other medications for hyperlipidemia and diabetes\par \par She continues to feel better with less shortness of breath and is tolerating 200 mg of Toprol.  She has no chest pain dizziness or syncope.  She denies palpitations\par \par \par \par Patient had been doing extremely well with less shortness of breath, she lost about 8 to 10 pounds.\par In October she went for routine endoscopy.  She did have a biopsy.  She went home and standing at the refrigerator she suddenly fell back felt dizzy and passed out hit her head.  She was taken to the emergency room at [facility] where she was found to be quite anemic received blood transfusion at [employer] to her head otherwise work-up was unremarkable.\par \par She has been followed very carefully by Dr. Ivan and her blood count has increased.  She is now on iron in addition to her other medications.  She did have shortness of breath but this now has resolved and she is feeling well without chest pain exertional dizziness exertional chest pain exertional shortness of breath.  She is very motivated to continue to lose weight.\par \par Post recent endoscopy she had an episode of syncope when she came home and was found to be anemic probably fluid depleted.  The symptom has not recurred\par \par Patient has been stable from a cardiac point of view has lost a few pounds.  She has no dizziness or syncope no exertional chest pain or chest pressure.\par \par Recent echocardiogram by Dr. Valdez March 2022 revealed hypertrophic left ventricle with systolic anterior motion with a subaortic gradient moderate calcification of the aortic valve.  They were unable to assess the severity of the aortic stenosis\par \par On her cardiac catheterization that I reviewed from approximately 6 months ago she had mild calcification of the aortic valve the valve was easy to cross and most of the gradient almost all the gradient was subaortic\par \par Since the last visit several months ago, he has been feeling well has lost about 5 pounds.  She does not walk that much but she denies significant shortness of breath exertional dizziness and has not had syncope.\par \par   She does not push herself that much with walking and she is not sure how much she can do.  Occasionally when she is bending she has momentary shortness of breath but it resolves when she stands up.\par \par   Blood tests were drawn today Dr. Valdez's office

## 2022-07-08 NOTE — DISCUSSION/SUMMARY
[FreeTextEntry1] :  the patient's cardiac status is stable.  However she is not that active and I encouraged her to try to increase her level of exercise walking longer distances.  She will continue on her present regimen of medication\par \par  no further cardiac work-up is needed presently\par  follow-up echo in 1 year from the prior echo which I believe will be March 2023\par  follow-up with me again in 4

## 2022-07-08 NOTE — ASSESSMENT
[FreeTextEntry1] : Roberta Mckenna has diabetes hypertension hyperlipidemia all under good control.  She has evidence of atherosclerosis and coronary disease on CT coronary angiogram nonobstructive from several years ago.  She is asymptomatic without chest pain chest pressure shortness of breath she is overweight but is making great attempts at losing weight.  She is on appropriate medications for hypertension and diabetes.  Recent cardiac catheterization revealed nonobstructive coronary disease with a hypertrophic cardiomyopathy with a dynamic and resting gradient.  Patient had been doing extremely well until a recent episode of GI bleed after an endoscopy but is now improved.  since last visit she continues to improve, blood pressure is under good control and she has minimal shortness of breath\par \par 1.  Hypertrophic cardiomyopathy with resting and provocable gradient now improved symptoms of shortness of breath on 100 of metoprolol in the morning and 100 at night.  She is free of significant shortness of breath or chest pain.  I do not believe the aortic stenosis is significant at this point but needs to be watched carefully.  She is clinically stable from the hypertrophic point of view\par \par 2.  Hyperlipidemia on statin cholesterol 163 triglycerides 129 HDL 76 LDL 61 at goal\par \par 3.  Diabetes type 2 on oral agents recent hemoglobin A1c 5.9 on last visit repeat bloods were drawn today\par \par 4.  Elevated BMI patient has lost 4  more pounds since last visit\par \par 5.  Nonobstructive coronary disease\par \par 6.  Status post syncopal episode secondary to GI bleed after endoscopy which is now resolved and her symptoms of shortness of breath fatigue have also resolved.\par \par  7.  Essential hypertension well-controlled

## 2022-08-09 RX ORDER — METOPROLOL SUCCINATE 100 MG/1
100 TABLET, EXTENDED RELEASE ORAL
Qty: 180 | Refills: 1 | Status: ACTIVE | COMMUNITY
Start: 2020-12-17 | End: 1900-01-01

## 2022-10-14 ENCOUNTER — APPOINTMENT (OUTPATIENT)
Dept: CARDIOLOGY | Facility: CLINIC | Age: 77
End: 2022-10-14

## 2022-10-14 VITALS
BODY MASS INDEX: 36.13 KG/M2 | HEART RATE: 63 BPM | WEIGHT: 185 LBS | SYSTOLIC BLOOD PRESSURE: 161 MMHG | OXYGEN SATURATION: 94 % | DIASTOLIC BLOOD PRESSURE: 81 MMHG

## 2022-10-14 PROCEDURE — 99214 OFFICE O/P EST MOD 30 MIN: CPT

## 2022-10-16 NOTE — HISTORY OF PRESENT ILLNESS
[FreeTextEntry1] : Roberta is 75 years old with hypertension well-controlled diabetes type 2 well-controlled hyperlipidemia on statin, elevated BMI (39) and a heart murmur.  She had a CT coronary angiogram many years ago which did reveal nonobstructive coronary disease.  She has been diagnosed with a hypertrophic cardiomyopathy with some calcification of the aortic valve\par \par She is a past history of uterine carcinoma many years ago no recurrence\par \par She denies chest pain chest pressure shortness of breath.  She is active but does not do formal exercise such as vigorous walking.  She denies orthopnea PND palpitations dizziness or syncope.\par \par Noninvasive cardiac evaluation\par EKG normal sinus rhythm left ventricular hypertrophy with repolarization unchanged\par 2D echo 2/18/2020 moderate concentric left ventricular hypertrophy normal left ventricular systolic function grade 1 diastolic dysfunction with impaired relaxation idiopathic hypertrophic subaortic stenosis with systolic anterior motion of the mitral valve severe mitral annular calcification mild pulmonary hypertension\par \par Carotid Doppler revealed some moderate intimal thickening in the right common carotid artery less than 50% stenosis in the right bulb the left common carotid artery had no significant atherosclerotic plaquing and no significant stenosis\par \par \par \par A repeat June 2021 echocardiogram again revealed mild concentric left ventricle hypertrophy with basal septal hypertrophy left ventricular outflow tract gradient of 59 mm not noted on the prior echo mild to moderate mitral valve regurgitation systolic anterior motion of the anterior leaflet of the mitral valve.  There appears to be some degree of aortic stenosis but cannot be accurately assessed because of the LVOT obstruction\par \par We had increased her Toprol-XL to 100 twice a day.  She has lost a few pounds and overall is feeling well since the last visit several months ago.  She has no exertional chest pain exertional shortness of breath or exertional dizziness\par \par Then she had an episode of shortness of breath with a moderate amount of exertion which was more than she has had in the past..  She has no edema orthopnea PND\par \par EKG reveals normal sinus rhythm left ventricle hypertrophy with repolarization\par \par CT coronary angiogram was nonconclusive but suggested the possibility of significant stenosis\par \par Subsequent to this she underwent cardiac catheterization which revealed nonobstructive coronary disease no significant stenosis, hyperdynamic left ventricle with a resting gradient which increased significantly with provocation such as PVC post PVC and Valsalva\par \par Patient had been complaining of increasing shortness of breath.  We increased her beta-blocker to 100 in the morning and 50 at night.  She has lost 6 pounds.  Overall she is feeling better with less shortness of breath she is able to do more activity.  She has no chest pain palpitations dizziness or syncope\par \par Patient remains on valsartan 160/12.5.  She is now on Toprol- in the a.m. and 100 in the p.m. along with her other medications for hyperlipidemia and diabetes\par \par She continues to feel better with less shortness of breath and is tolerating 200 mg of Toprol.  She has no chest pain dizziness or syncope.  She denies palpitations\par \par Patient had been doing extremely well with less shortness of breath, she lost about 8 to 10 pounds.\par In October she went for routine endoscopy.  She did have a biopsy.  She went home and standing at the refrigerator she suddenly fell back felt dizzy and passed out hit her head.  She was taken to the emergency room at [facility] where she was found to be quite anemic received blood transfusion at [facility] to her head otherwise work-up was unremarkable.\par \[facility] Visit July 8, 2022:  She has been followed very carefully by  and her blood count has increased.  She is now on iron in addition to her other medications.  She did have shortness of breath but this now has resolved and she is feeling well without chest pain exertional dizziness exertional chest pain exertional shortness of breath.  She is very motivated to continue to lose weight.\par \par Post recent endoscopy she had an episode of syncope when she came home and was found to be anemic probably fluid depleted.  The symptom has not recurred\[facility] \[facility] Visit October 14, 2022: Overall since the last visit the patient has been feeling well.  She denies significant shortness of breath.  Her weight has not changed significantly at 185 pounds.  She denies exertional dizziness or syncope, she denies palpitations lightheadedness.  She has some mild exertional shortness of breath but is functioning at a good level.\par \par Patient has been stable from a cardiac point of view has lost a few pounds.  She has no dizziness or syncope no exertional chest pain or chest pressure.\par \par Recent echocardiogram by Dr. Valdez March 2022 revealed hypertrophic left ventricle with systolic anterior motion with a subaortic gradient moderate calcification of the aortic valve.  They were unable to assess the severity of the aortic stenosis\par \par On her cardiac catheterization that I reviewed from approximately 6 months ago she had mild calcification of the aortic valve the valve was easy to cross and most of the gradient almost all the gradient was subaortic\par \par Since the last visit several months ago, he has been feeling well has lost about 5 pounds.  She does not walk that much but she denies significant shortness of breath exertional dizziness and has not had syncope.\par \par   She does not push herself that much with walking and she is not sure how much she can do.  Occasionally when she is bending she has momentary shortness of breath but it resolves when she stands up.\par \par   Blood tests were drawn today Dr. Valdez's office

## 2022-10-16 NOTE — ASSESSMENT
[FreeTextEntry1] : Roberta Mckenna has diabetes hypertension hyperlipidemia all under good control.  She has evidence of atherosclerosis and coronary disease on CT coronary angiogram nonobstructive from several years ago.  She is asymptomatic without chest pain chest pressure shortness of breath she is overweight but is making great attempts at losing weight.  She is on appropriate medications for hypertension and diabetes.  Recent cardiac catheterization revealed nonobstructive coronary disease with a hypertrophic cardiomyopathy with a dynamic and resting gradient.  Patient had been doing extremely well until a recent episode of GI bleed after an endoscopy but is now improved.  since last visit she continues to improve, blood pressure is under good control and she has minimal shortness of breath.  Patient remains quite stable on present visit October 2022 but does remain overweight but her blood pressure is extremely well controlled\par \par 1.  Hypertrophic cardiomyopathy with resting and provocable gradient now improved symptoms of shortness of breath on 100 of metoprolol in the morning and 100 at night.  She is free of significant shortness of breath or chest pain.  I do not believe the aortic stenosis is significant at this point but needs to be watched carefully.  She is clinically stable from the hypertrophic point of view\par \par 2.  Hyperlipidemia on statin cholesterol 163 triglycerides 129 HDL 76 LDL 61 at goal\par \par 3.  Diabetes type 2 on oral agents recent hemoglobin A1c 5.9 on last visit repeat bloods were drawn today\par \par 4.  Elevated BMI patient has lost 4  more pounds since last visit\par \par 5.  Nonobstructive coronary disease\par \par 6.  Status post syncopal episode secondary to GI bleed after endoscopy which is now resolved and her symptoms of shortness of breath fatigue have also resolved.  There has been no recurrence of syncope\par \par  7.  Essential hypertension well-controlled

## 2022-10-16 NOTE — DISCUSSION/SUMMARY
[FreeTextEntry1] : Overall the patient is quite stable and she should continue on her present regimen of medication including the metoprolol..  We can consider increasing metoprolol further to perhaps 125 mg twice a day but for now would continue on her present regimen\par \par She should have a follow-up echocardiogram with Dr. Perez within the next 3 to 4 months and to follow-up with me thereafter.

## 2023-01-18 ENCOUNTER — APPOINTMENT (OUTPATIENT)
Dept: CARDIOLOGY | Facility: CLINIC | Age: 78
End: 2023-01-18
Payer: MEDICARE

## 2023-01-18 VITALS
HEART RATE: 71 BPM | OXYGEN SATURATION: 97 % | BODY MASS INDEX: 35.35 KG/M2 | WEIGHT: 181 LBS | SYSTOLIC BLOOD PRESSURE: 142 MMHG | DIASTOLIC BLOOD PRESSURE: 80 MMHG

## 2023-01-18 PROCEDURE — 99214 OFFICE O/P EST MOD 30 MIN: CPT

## 2023-01-18 NOTE — ASSESSMENT
[FreeTextEntry1] : Roberta Mckenna has diabetes hypertension hyperlipidemia all under good control.  She has evidence of atherosclerosis and coronary disease on CT coronary angiogram nonobstructive from several years ago.  She is asymptomatic without chest pain chest pressure shortness of breath she is overweight but is making great attempts at losing weight.  She is on appropriate medications for hypertension and diabetes.  Recent cardiac catheterization revealed nonobstructive coronary disease with a hypertrophic cardiomyopathy with a dynamic and resting gradient.  Patient had been doing extremely well until a recent episode of GI bleed after an endoscopy but is now improved.  since last visit she continues to improve, blood pressure is under good control and she has minimal shortness of breath.   patient's cardiac status remains stable with controlled blood pressure no symptoms and stable physical exam\par \par 1.  Hypertrophic cardiomyopathy with resting and provocable gradient now improved symptoms of shortness of breath on 100 of metoprolol in the morning and 100 at night.  She is free of significant shortness of breath or chest pain.  I do not believe the aortic stenosis is significant at this point but needs to be watched carefully.  She is clinically stable from the hypertrophic point of view.  There has been no change in his symptoms and her physical exam is unchanged.  Repeat echo was scheduled for April 2023\par \par 2.  Hyperlipidemia on statin cholesterol 163 triglycerides 129 HDL 76 LDL 61 at goal.  Recent blood test will be sent to me\par \par 3.  Diabetes type 2 on oral agents recent hemoglobin A1c 5.9 on last visit repeat bloods were drawn today\par \par 4.  Elevated BMI patient has lost 4  more pounds since last visit\par \par 5.  Nonobstructive coronary disease\par \par 6.  Status post syncopal episode secondary to GI bleed after endoscopy which is now resolved and her symptoms of shortness of breath fatigue have also resolved.  There has been no recurrence of syncope\par \par  7.  Essential hypertension well-controlled

## 2023-01-18 NOTE — DISCUSSION/SUMMARY
[FreeTextEntry1] :  1.  I reassured the patient that presently her cardiac status remains stable\par  2.  She should have a repeat echocardiogram in April 2023 and I be happy to follow-up after the echocardiogram.  This is to reevaluate her hypertrophic cardiomyopathy and a concomitant element of some degree of aortic stenosis which I do not think is that significant at the present time\par  3.  Continue with weight reduction.  She did not tolerate Ozempic but is being started on some other medications to try to lose weight

## 2023-01-18 NOTE — REASON FOR VISIT
[FreeTextEntry1] : Roberta Mckenna 77 years old here for follow-up of her cardiac status hypertrophic cardiopathy calcified aortic valve and calcification of her coronaries.

## 2023-01-18 NOTE — HISTORY OF PRESENT ILLNESS
[FreeTextEntry1] : Roberta is 75 years old with hypertension well-controlled diabetes type 2 well-controlled hyperlipidemia on statin, elevated BMI (39) and a heart murmur.  She had a CT coronary angiogram many years ago which did reveal nonobstructive coronary disease.  She has been diagnosed with a hypertrophic cardiomyopathy with some calcification of the aortic valve\par \par She is a past history of uterine carcinoma many years ago no recurrence\par \par She denies chest pain chest pressure shortness of breath.  She is active but does not do formal exercise such as vigorous walking.  She denies orthopnea PND palpitations dizziness or syncope.\par \par Noninvasive cardiac evaluation\par EKG normal sinus rhythm left ventricular hypertrophy with repolarization unchanged\par 2D echo 2/18/2020 moderate concentric left ventricular hypertrophy normal left ventricular systolic function grade 1 diastolic dysfunction with impaired relaxation idiopathic hypertrophic subaortic stenosis with systolic anterior motion of the mitral valve severe mitral annular calcification mild pulmonary hypertension\par \par Carotid Doppler revealed some moderate intimal thickening in the right common carotid artery less than 50% stenosis in the right bulb the left common carotid artery had no significant atherosclerotic plaquing and no significant stenosis\par \par \par \par A repeat June 2021 echocardiogram again revealed mild concentric left ventricle hypertrophy with basal septal hypertrophy left ventricular outflow tract gradient of 59 mm not noted on the prior echo mild to moderate mitral valve regurgitation systolic anterior motion of the anterior leaflet of the mitral valve.  There appears to be some degree of aortic stenosis but cannot be accurately assessed because of the LVOT obstruction\par \par We had increased her Toprol-XL to 100 twice a day.  She has lost a few pounds and overall is feeling well since the last visit several months ago.  She has no exertional chest pain exertional shortness of breath or exertional dizziness\par \par Then she had an episode of shortness of breath with a moderate amount of exertion which was more than she has had in the past..  She has no edema orthopnea PND\par \par EKG reveals normal sinus rhythm left ventricle hypertrophy with repolarization\par \par CT coronary angiogram was nonconclusive but suggested the possibility of significant stenosis\par \par Subsequent to this she underwent cardiac catheterization which revealed nonobstructive coronary disease no significant stenosis, hyperdynamic left ventricle with a resting gradient which increased significantly with provocation such as PVC post PVC and Valsalva\par \par Patient had been complaining of increasing shortness of breath.  We increased her beta-blocker to 100 in the morning and 50 at night.  She has lost 6 pounds.  Overall she is feeling better with less shortness of breath she is able to do more activity.  She has no chest pain palpitations dizziness or syncope\par \par Patient remains on valsartan 160/12.5.  She is now on Toprol- in the a.m. and 100 in the p.m. along with her other medications for hyperlipidemia and diabetes\par \par She continues to feel better with less shortness of breath and is tolerating 200 mg of Toprol.  She has no chest pain dizziness or syncope.  She denies palpitations\par \par Patient had been doing extremely well with less shortness of breath, she lost about 8 to 10 pounds.\par In October she went for routine endoscopy.  She did have a biopsy.  She went home and standing at the refrigerator she suddenly fell back felt dizzy and passed out hit her head.  She was taken to the emergency room at [facility] where she was found to be quite anemic received blood transfusion at [employer] to her head otherwise work-up was unremarkable.\par \[facility] Visit July 8, 2022:  She has been followed very carefully by  and her blood count has increased.  She is now on iron in addition to her other medications.  She did have shortness of breath but this now has resolved and she is feeling well without chest pain exertional dizziness exertional chest pain exertional shortness of breath.  She is very motivated to continue to lose weight.\par \par Post recent endoscopy she had an episode of syncope when she came home and was found to be anemic probably fluid depleted.  The symptom has not recurred\[facility] \[facility] Visit October 14, 2022: Overall since the last visit the patient has been feeling well.  She denies significant shortness of breath.  Her weight has not changed significantly at 185 pounds.  She denies exertional dizziness or syncope, she denies palpitations lightheadedness.  She has some mild exertional shortness of breath but is functioning at a good level.\par \par Patient has been stable from a cardiac point of view has lost a few pounds.  She has no dizziness or syncope no exertional chest pain or chest pressure.\par \par Recent echocardiogram by Dr. Valdez March 2022 revealed hypertrophic left ventricle with systolic anterior motion with a subaortic gradient moderate calcification of the aortic valve.  They were unable to assess the severity of the aortic stenosis\par \par On her cardiac catheterization that I reviewed from approximately 6 months ago she had mild calcification of the aortic valve the valve was easy to cross and most of the gradient almost all the gradient was subaortic\par \par Since the last visit several months ago, he has been feeling well has lost about 5 pounds.  She does not walk that much but she denies significant shortness of breath exertional dizziness and has not had syncope.\par \par   She does not push herself that much with walking and she is not sure how much she can do.  Occasionally when she is bending she has momentary shortness of breath but it resolves when she stands up.\par   \par Visit January 18, 2023: Patient has lost a few pounds since the last visit.  She denies chest pain chest pressure shortness of breath.  She has no exertional dizziness exertional chest pain.  She denies significant shortness of breath.  She has no edema orthopnea PND\par \par  patient had recent blood work which was apparently all in order.  She has lost about 3 to 4 pounds since the last visit.  She remains on stable medication

## 2023-09-24 NOTE — ASSESSMENT
Addended by: BRIEN MOORE on: 9/24/2023 02:30 PM     Modules accepted: Orders     [FreeTextEntry1] : Roberta Mckenna has diabetes hypertension hyperlipidemia all under good control.  She has evidence of atherosclerosis and coronary disease on CT coronary angiogram nonobstructive from several years ago.  She is asymptomatic without chest pain chest pressure shortness of breath she is overweight but is making great attempts at losing weight.  She is on appropriate medications for hypertension and diabetes.  Recent cardiac catheterization revealed nonobstructive coronary disease with a hypertrophic cardiomyopathy with a dynamic and resting gradient.  Patient had been doing extremely well until a recent episode of GI bleed after an endoscopy but is now improved.\par \par 1.  Hypertrophic cardiomyopathy with resting and provocable gradient now improved symptoms of shortness of breath on 100 of metoprolol in the morning and 100 at night.  She is free of significant shortness of breath or chest pain\par \par 2.  Hyperlipidemia on statin cholesterol 163 triglycerides 129 HDL 76 LDL 61 at goal\par \par 3.  Diabetes type 2 on oral agents recent hemoglobin A1c 5.9\par \par 4.  Elevated BMI patient has lost 10 pounds since last visit\par \par 5.  Nonobstructive coronary disease\par \par 6.  Status post syncopal episode secondary to GI bleed after endoscopy which is now resolved and her symptoms of shortness of breath fatigue have also resolved.

## 2023-09-26 ENCOUNTER — APPOINTMENT (OUTPATIENT)
Dept: CARDIOLOGY | Facility: CLINIC | Age: 78
End: 2023-09-26
Payer: MEDICARE

## 2023-09-26 VITALS
OXYGEN SATURATION: 95 % | SYSTOLIC BLOOD PRESSURE: 186 MMHG | BODY MASS INDEX: 35.53 KG/M2 | DIASTOLIC BLOOD PRESSURE: 93 MMHG | WEIGHT: 181 LBS | HEIGHT: 60 IN | HEART RATE: 93 BPM

## 2023-09-26 PROCEDURE — 99214 OFFICE O/P EST MOD 30 MIN: CPT

## 2023-12-27 ENCOUNTER — APPOINTMENT (OUTPATIENT)
Dept: CARDIOLOGY | Facility: CLINIC | Age: 78
End: 2023-12-27
Payer: MEDICARE

## 2023-12-27 VITALS
SYSTOLIC BLOOD PRESSURE: 150 MMHG | WEIGHT: 184 LBS | HEIGHT: 60 IN | HEART RATE: 82 BPM | OXYGEN SATURATION: 96 % | DIASTOLIC BLOOD PRESSURE: 80 MMHG | BODY MASS INDEX: 36.12 KG/M2

## 2023-12-27 PROCEDURE — 99214 OFFICE O/P EST MOD 30 MIN: CPT

## 2023-12-27 NOTE — ASSESSMENT
[FreeTextEntry1] : Roberta Mckenna has diabetes hypertension hyperlipidemia . She has evidence of atherosclerosis and coronary disease on CT coronary angiogram nonobstructive from several years ago. She is asymptomatic without chest pain chest pressure shortness of breath she is overweight but is making great attempts at losing weight. She is on appropriate medications for hypertension and diabetes.. Patient had been doing extremely well until a recent episode of GI bleed after an endoscopy but is now improved. since last visit she continues to improve, blood pressure is under good control and she has minimal shortness of breath. patient's cardiac status remains stable with controlled blood pressure no symptoms and stable physical exam.  Patient continues with exertional shortness of breath which is probably combination of diastolic dysfunction hypertrophic cardiomyopathy and her weight.  She does have obstructive sleep apnea but does not use a CPAP machine  1. Hypertrophic cardiomyopathy with resting and provocable gradient now improved symptoms of shortness of breath on 100 of metoprolol in the morning and 100 at night. She is free of significant shortness of breath or chest pain. I do not believe the aortic stenosis is significant at this point but needs to be watched carefully. She is clinically stable from the hypertrophic point of view. There has been no change in his symptoms and her physical exam is unchanged.  2D echo April 2023 revealed only a mild left ventricular outflow tract gradient mild pulmonary hypertension normal left ventricular function probable diastolic dysfunction.  She probably has a gradient with Valsalva or certain elements of exercise  2. Hyperlipidemia on statin cholesterol 163 triglycerides 129 HDL 76 LDL 61 at goal.  3. Diabetes type 2 on oral agents recent hemoglobin A1c 5.9 on last visit repeat bloods were drawn today  4. Elevated BMI patient has lost 4 more pounds since last visit  5. Nonobstructive coronary disease   8. Essential hypertension better controlled today

## 2023-12-27 NOTE — HISTORY OF PRESENT ILLNESS
[FreeTextEntry1] : Roberta is 75 years old with hypertension well-controlled diabetes type 2 well-controlled hyperlipidemia on statin, elevated BMI (39) and a heart murmur.  She had a CT coronary angiogram many years ago which did reveal nonobstructive coronary disease.  She has been diagnosed with a hypertrophic cardiomyopathy with some calcification of the aortic valve  She is a past history of uterine carcinoma many years ago no recurrence  She denies chest pain chest pressure shortness of breath.  She is active but does not do formal exercise such as vigorous walking.  She denies orthopnea PND palpitations dizziness or syncope.  Noninvasive cardiac evaluation EKG normal sinus rhythm left ventricular hypertrophy with repolarization unchanged 2D echo 2/18/2020 moderate concentric left ventricular hypertrophy normal left ventricular systolic function grade 1 diastolic dysfunction with impaired relaxation idiopathic hypertrophic subaortic stenosis with systolic anterior motion of the mitral valve severe mitral annular calcification mild pulmonary hypertension  Carotid Doppler revealed some moderate intimal thickening in the right common carotid artery less than 50% stenosis in the right bulb the left common carotid artery had no significant atherosclerotic plaquing and no significant stenosis    A repeat June 2021 echocardiogram again revealed mild concentric left ventricle hypertrophy with basal septal hypertrophy left ventricular outflow tract gradient of 59 mm not noted on the prior echo mild to moderate mitral valve regurgitation systolic anterior motion of the anterior leaflet of the mitral valve.  There appears to be some degree of aortic stenosis but cannot be accurately assessed because of the LVOT obstruction  We had increased her Toprol-XL to 100 twice a day.  She has lost a few pounds and overall is feeling well since the last visit several months ago.  She has no exertional chest pain exertional shortness of breath or exertional dizziness  Then she had an episode of shortness of breath with a moderate amount of exertion which was more than she has had in the past..  She has no edema orthopnea PND  EKG reveals normal sinus rhythm left ventricle hypertrophy with repolarization  CT coronary angiogram was nonconclusive but suggested the possibility of significant stenosis  Subsequent to this she underwent cardiac catheterization which revealed nonobstructive coronary disease no significant stenosis, hyperdynamic left ventricle with a resting gradient which increased significantly with provocation such as PVC post PVC and Valsalva  Patient had been complaining of increasing shortness of breath.  We increased her beta-blocker to 100 in the morning and 50 at night.  She has lost 6 pounds.  Overall she is feeling better with less shortness of breath she is able to do more activity.  She has no chest pain palpitations dizziness or syncope  Patient remains on valsartan 160/12.5.  She is now on Toprol- in the a.m. and 100 in the p.m. along with her other medications for hyperlipidemia and diabetes  She continues to feel better with less shortness of breath and is tolerating 200 mg of Toprol.  She has no chest pain dizziness or syncope.  She denies palpitations  Patient had been doing extremely well with less shortness of breath, she lost about 8 to 10 pounds. In October she went for routine endoscopy.  She did have a biopsy.  She went home and standing at the refrigerator she suddenly fell back felt dizzy and passed out hit her head.  She was taken to the emergency room at [facility] where she was found to be quite anemic received blood transfusion at [facility] to her head otherwise work-up was unremarkable.  Visit July 8, 2022:  She has been followed very carefully by  and her blood count has increased.  She is now on iron in addition to her other medications.  She did have shortness of breath but this now has resolved and she is feeling well without chest pain exertional dizziness exertional chest pain exertional shortness of breath.  She is very motivated to continue to lose weight.  Post recent endoscopy she had an episode of syncope when she came home and was found to be anemic probably fluid depleted.  The symptom has not recurred  Visit October 14, 2022: Overall since the last visit the patient has been feeling well.  She denies significant shortness of breath.  Her weight has not changed significantly at 185 pounds.  She denies exertional dizziness or syncope, she denies palpitations lightheadedness.  She has some mild exertional shortness of breath but is functioning at a good level.  Patient has been stable from a cardiac point of view has lost a few pounds.  She has no dizziness or syncope no exertional chest pain or chest pressure.  Recent echocardiogram by Dr. Valdez March 2022 revealed hypertrophic left ventricle with systolic anterior motion with a subaortic gradient moderate calcification of the aortic valve.  They were unable to assess the severity of the aortic stenosis  On her cardiac catheterization that I reviewed from approximately 6 months ago she had mild calcification of the aortic valve the valve was easy to cross and most of the gradient almost all the gradient was subaortic  Since the last visit several months ago, he has been feeling well has lost about 5 pounds.  She does not walk that much but she denies significant shortness of breath exertional dizziness and has not had syncope.    She does not push herself that much with walking and she is not sure how much she can do.  Occasionally when she is bending she has momentary shortness of breath but it resolves when she stands up.    Visit January 18, 2023: Patient has lost a few pounds since the last visit.  She denies chest pain chest pressure shortness of breath.  She has no exertional dizziness exertional chest pain.  She denies significant shortness of breath.  She has no edema orthopnea PND   patient had recent blood work which was apparently all in order.  She has lost about 3 to 4 pounds since the last visit.  She remains on stable medication  Visit September 26, 2023: Overall the patient feels well since last visit.  She denies chest pain chest pressure shortness of breath dizziness or syncope.  Her weight has been stable at 181 pounds but her blood pressure is elevated today at 170/686.  She did eat some salty yesterday but usually she is careful with salt in her diet  2D echo on April 11, 2023: Mild pulmonary hypertension mild left ventricular outflow tract obstruction though no gradient measured with Valsalva, mild concentric left ventricular pressure 50 severe mitral annular calcification and moderate mitral regurgitation  Visit December 27, 2023: Overall the patient has been stable since last visit but she has gained 3 pounds and not always careful with salt in her diet.  On last visit her blood pressure was elevated.  She does have exertional shortness of breath walking 1-2 blocks stops and then can continue on.  She carries a heavy package she feels more short of breath but then can go on after she rests.  There is no associated chest pain

## 2023-12-27 NOTE — REASON FOR VISIT
[FreeTextEntry1] : Roberta Mckenna 78 years old here for follow-up of her cardiac status.  She has a history of hypertension hypertrophic cardiomyopathy and hyperlipidemia

## 2023-12-27 NOTE — DISCUSSION/SUMMARY
[FreeTextEntry1] : 1.  I told her to go back on a low-salt diet 2.  Weight reduction again discussed 3.  She does have a history of obstructive sleep apnea does not use CPAP and this probably should be reevaluated 4.  We might want to consider adding an SGLT2 inhibitor for possible diastolic dysfunction related to the shortness of breath  I will discuss with Dr. Valdez

## 2024-04-03 ENCOUNTER — APPOINTMENT (OUTPATIENT)
Dept: CARDIOLOGY | Facility: CLINIC | Age: 79
End: 2024-04-03
Payer: MEDICARE

## 2024-04-03 VITALS
DIASTOLIC BLOOD PRESSURE: 82 MMHG | RESPIRATION RATE: 17 BRPM | WEIGHT: 176 LBS | HEART RATE: 66 BPM | OXYGEN SATURATION: 96 % | HEIGHT: 60 IN | SYSTOLIC BLOOD PRESSURE: 153 MMHG | BODY MASS INDEX: 34.55 KG/M2

## 2024-04-03 DIAGNOSIS — I42.2 OTHER HYPERTROPHIC CARDIOMYOPATHY: ICD-10-CM

## 2024-04-03 DIAGNOSIS — I10 ESSENTIAL (PRIMARY) HYPERTENSION: ICD-10-CM

## 2024-04-03 DIAGNOSIS — I25.10 ATHEROSCLEROTIC HEART DISEASE OF NATIVE CORONARY ARTERY W/OUT ANGINA PECTORIS: ICD-10-CM

## 2024-04-03 DIAGNOSIS — E78.2 MIXED HYPERLIPIDEMIA: ICD-10-CM

## 2024-04-03 PROCEDURE — G2211 COMPLEX E/M VISIT ADD ON: CPT

## 2024-04-03 PROCEDURE — 99214 OFFICE O/P EST MOD 30 MIN: CPT

## 2024-04-03 NOTE — REASON FOR VISIT
[FreeTextEntry1] : Roberta Mckenna 78 years old here for follow-up of her cardiac status.  She was seen on April 3, 2024.  She has a hypertrophic cardiomyopathy and a previous cath with calcification of her coronaries but no significant coronary stenoses.

## 2024-04-03 NOTE — ASSESSMENT
[FreeTextEntry1] : Roberta Mckenna has diabetes hypertension hyperlipidemia . She has evidence of atherosclerosis and coronary disease on CT coronary angiogram nonobstructive from several years ago. She is asymptomatic without chest pain chest pressure shortness of breath she is overweight but is making great attempts at losing weight. She is on appropriate medications for hypertension and diabetes.. Patient had been doing extremely well until a recent episode of GI bleed after an endoscopy but is now improved. since last visit she continues to improve, blood pressure is under good control and she has minimal shortness of breath. patient's cardiac status remains stable with controlled blood pressure no symptoms and stable physical exam.  Patient continues with exertional shortness of breath which is probably combination of diastolic dysfunction hypertrophic cardiomyopathy and her weight.  She does have obstructive sleep apnea but does not use a CPAP machine  Presently in April 2024 she remains quite stable with recent weight loss, better exercise tolerance, well-controlled blood pressure and stable physical exam..   1. Hypertrophic cardiomyopathy with resting and provocable gradient now improved symptoms of shortness of breath on 100 of metoprolol in the morning and 100 at night. She is free of significant shortness of breath or chest pain. I do not believe the aortic stenosis is significant at this point but needs to be watched carefully. She is clinically stable from the hypertrophic point of view. There has been no change in his symptoms and her physical exam is unchanged.  2D echo April 2023 revealed only a mild left ventricular outflow tract gradient mild pulmonary hypertension normal left ventricular function probable diastolic dysfunction.  Her murmur in April 2024 does increase with Valsalva but her hypertrophic cardiomyopathy is quite stable.  2. Hyperlipidemia on statin cholesterol 163 triglycerides 129 HDL 76 LDL 61 at goal.  3. Diabetes type 2 on oral agents recent hemoglobin A1c 5.9 on last visit repeat bloods were drawn today  4. Elevated BMI patient has lost 8 more pounds since last visit  5. Nonobstructive coronary disease   8. Essential hypertension better controlled today on present medicines valsartan and Toprol-XL

## 2024-04-03 NOTE — DISCUSSION/SUMMARY
[FreeTextEntry1] : 1.  I reassured her that her cardiac status was stable.  I think she has improved with weight loss 2.  She should continue her present regimen of medication, continue on a low-salt diet and continue to monitor her blood pressure at home 3.  I will try to obtain her most recent blood work and echocardiogram from Dr. Valdez  Routine follow-up again in 6 months

## 2024-04-03 NOTE — HISTORY OF PRESENT ILLNESS
[FreeTextEntry1] : Roberta is 78 years old with hypertension well-controlled diabetes type 2 well-controlled hyperlipidemia on statin, elevated BMI (39) and a heart murmur.  She had a coronary angiogram many years ago which did reveal nonobstructive coronary disease.  She has been diagnosed with a hypertrophic cardiomyopathy with some calcification of the aortic valve  She is a past history of uterine carcinoma many years ago no recurrence  She denies chest pain chest pressure shortness of breath.  She is active but does not do formal exercise such as vigorous walking.  She denies orthopnea PND palpitations dizziness or syncope.  Noninvasive cardiac evaluation EKG normal sinus rhythm left ventricular hypertrophy with repolarization unchanged 2D echo 2/18/2020 moderate concentric left ventricular hypertrophy normal left ventricular systolic function grade 1 diastolic dysfunction with impaired relaxation idiopathic hypertrophic subaortic stenosis with systolic anterior motion of the mitral valve severe mitral annular calcification mild pulmonary hypertension  Carotid Doppler revealed some moderate intimal thickening in the right common carotid artery less than 50% stenosis in the right bulb the left common carotid artery had no significant atherosclerotic plaquing and no significant stenosis   A repeat  echocardiogram June 2021 again revealed mild concentric left ventricle hypertrophy with basal septal hypertrophy left ventricular outflow tract gradient of 59 mm not noted on the prior echo mild to moderate mitral valve regurgitation systolic anterior motion of the anterior leaflet of the mitral valve.  There appears to be some degree of aortic stenosis but cannot be accurately assessed because of the LVOT obstruction  We had increased her Toprol-XL to 100 twice a day.  She has lost a few pounds and overall is feeling well since the last visit several months ago.  She has no exertional chest pain exertional shortness of breath or exertional dizziness  Then she had an episode of shortness of breath with a moderate amount of exertion which was more than she has had in the past..  She has no edema orthopnea PND  EKG reveals normal sinus rhythm left ventricle hypertrophy with repolarization  CT coronary angiogram was nonconclusive but suggested the possibility of significant stenosis  Subsequent to this she underwent cardiac catheterization which revealed nonobstructive coronary disease no significant stenosis, hyperdynamic left ventricle with a resting gradient which increased significantly with provocation such as PVC post PVC and Valsalva  Patient had been complaining of increasing shortness of breath.  We increased her beta-blocker to 100 in the morning and 50 at night.  She has lost 6 pounds.  Overall she is feeling better with less shortness of breath she is able to do more activity.  She has no chest pain palpitations dizziness or syncope  Patient remains on valsartan 160/12.5.  She is now on Toprol- in the a.m. and 100 in the p.m. along with her other medications for hyperlipidemia and diabetes  She continues to feel better with less shortness of breath and is tolerating 200 mg of Toprol.  She has no chest pain dizziness or syncope.  She denies palpitations  Patient had been doing extremely well with less shortness of breath, she lost about 8 to 10 pounds. In October she went for routine endoscopy.  She did have a biopsy.  She went home and standing at the refrigerator she suddenly fell back felt dizzy and passed out hit her head.  She was taken to the emergency room at [facility] where she was found to be quite anemic received blood transfusion at [employer] to her head otherwise work-up was unremarkable.  Visit July 8, 2022:  She has been followed very carefully by  and her blood count has increased.  She is now on iron in addition to her other medications.  She did have shortness of breath but this now has resolved and she is feeling well without chest pain exertional dizziness exertional chest pain exertional shortness of breath.  She is very motivated to continue to lose weight.  Post recent endoscopy she had an episode of syncope when she came home and was found to be anemic probably fluid depleted.  The symptom has not recurred  Visit October 14, 2022: Overall since the last visit the patient has been feeling well.  She denies significant shortness of breath.  Her weight has not changed significantly at 185 pounds.  She denies exertional dizziness or syncope, she denies palpitations lightheadedness.  She has some mild exertional shortness of breath but is functioning at a good level.  Patient has been stable from a cardiac point of view has lost a few pounds.  She has no dizziness or syncope no exertional chest pain or chest pressure.  Recent echocardiogram by Dr. Valdez March 2022 revealed hypertrophic left ventricle with systolic anterior motion with a subaortic gradient moderate calcification of the aortic valve.  They were unable to assess the severity of the aortic stenosis  On her cardiac catheterization that I reviewed from approximately 6 months ago she had mild calcification of the aortic valve the valve was easy to cross and most of the gradient almost all the gradient was subaortic  Since the last visit several months ago, he has been feeling well has lost about 5 pounds.  She does not walk that much but she denies significant shortness of breath exertional dizziness and has not had syncope.    She does not push herself that much with walking and she is not sure how much she can do.  Occasionally when she is bending she has momentary shortness of breath but it resolves when she stands up.    Visit January 18, 2023: Patient has lost a few pounds since the last visit.  She denies chest pain chest pressure shortness of breath.  She has no exertional dizziness exertional chest pain.  She denies significant shortness of breath.  She has no edema orthopnea PND   patient had recent blood work which was apparently all in order.  She has lost about 3 to 4 pounds since the last visit.  She remains on stable medication  Visit September 26, 2023: Overall the patient feels well since last visit.  She denies chest pain chest pressure shortness of breath dizziness or syncope.  Her weight has been stable at 181 pounds but her blood pressure is elevated today at 170/686.  She did eat some salty yesterday but usually she is careful with salt in her diet  2D echo on April 11, 2023: Mild pulmonary hypertension mild left ventricular outflow tract obstruction though no gradient measured with Valsalva, mild concentric left ventricular pressure 50 severe mitral annular calcification and moderate mitral regurgitation  Visit December 27, 2023: Overall the patient has been stable since last visit but she has gained 3 pounds and not always careful with salt in her diet.  On last visit her blood pressure was elevated.  She does have exertional shortness of breath walking 1-2 blocks stops and then can continue on.  She carries a heavy package she feels more short of breath but then can go on after she rests.  There is no associated chest pain  Visit April 3, 2024:  Patient is lost about 6 to 7 pounds since last visit.  She feels well.  She denies shortness of breath exertional chest pain or chest pressure.  She has some mild shortness of breath with significant exertion.  She has no dizziness or syncope.  Her blood pressure in the office is apparently well-controlled.  She remains on stable medication.  She recently had a bronchitis which is now resolved she took antibiotics. She had been on Ozempic and Mounjaro but she felt ill with nausea decreased appetite some diarrhea.  She stopped these medications  Presently she is on Crestor 20 metformin 500 mg twice a day metoprolol  and valsartan 160

## 2024-08-27 NOTE — ASU PREOP CHECKLIST - NOTHING BY MOUTH SINCE
[Time Spent: ___ minutes] : I have spent [unfilled] minutes of time on the encounter which excludes teaching and separately reported services.
11-Jul-2021 20:00

## 2024-10-02 ENCOUNTER — NON-APPOINTMENT (OUTPATIENT)
Age: 79
End: 2024-10-02

## 2024-10-02 ENCOUNTER — APPOINTMENT (OUTPATIENT)
Dept: CARDIOLOGY | Facility: CLINIC | Age: 79
End: 2024-10-02
Payer: MEDICARE

## 2024-10-02 VITALS
SYSTOLIC BLOOD PRESSURE: 149 MMHG | DIASTOLIC BLOOD PRESSURE: 76 MMHG | WEIGHT: 169 LBS | BODY MASS INDEX: 33.01 KG/M2 | OXYGEN SATURATION: 98 % | HEART RATE: 64 BPM

## 2024-10-02 DIAGNOSIS — R42 DIZZINESS AND GIDDINESS: ICD-10-CM

## 2024-10-02 DIAGNOSIS — I42.2 OTHER HYPERTROPHIC CARDIOMYOPATHY: ICD-10-CM

## 2024-10-02 DIAGNOSIS — E78.2 MIXED HYPERLIPIDEMIA: ICD-10-CM

## 2024-10-02 DIAGNOSIS — I25.10 ATHEROSCLEROTIC HEART DISEASE OF NATIVE CORONARY ARTERY W/OUT ANGINA PECTORIS: ICD-10-CM

## 2024-10-02 PROCEDURE — 99214 OFFICE O/P EST MOD 30 MIN: CPT

## 2024-10-02 PROCEDURE — G2211 COMPLEX E/M VISIT ADD ON: CPT

## 2024-10-02 PROCEDURE — 93000 ELECTROCARDIOGRAM COMPLETE: CPT

## 2024-10-02 NOTE — REASON FOR VISIT
[FreeTextEntry1] : Roberta weldon 79 years old here for follow-up of her cardiac status.  She was seen on Tober second 24

## 2024-10-02 NOTE — HISTORY OF PRESENT ILLNESS
[FreeTextEntry1] : Roberta is 78 years old with hypertension well-controlled diabetes type 2 well-controlled hyperlipidemia on statin, elevated BMI (39) and a heart murmur.  She had a coronary angiogram many years ago which did reveal nonobstructive coronary disease.  She has been diagnosed with a hypertrophic cardiomyopathy with some calcification of the aortic valve  She is a past history of uterine carcinoma many years ago no recurrence  She denies chest pain chest pressure shortness of breath.  She is active but does not do formal exercise such as vigorous walking.  She denies orthopnea PND palpitations dizziness or syncope.  Noninvasive cardiac evaluation EKG normal sinus rhythm left ventricular hypertrophy with repolarization unchanged 2D echo 2/18/2020 moderate concentric left ventricular hypertrophy normal left ventricular systolic function grade 1 diastolic dysfunction with impaired relaxation idiopathic hypertrophic subaortic stenosis with systolic anterior motion of the mitral valve severe mitral annular calcification mild pulmonary hypertension  Carotid Doppler revealed some moderate intimal thickening in the right common carotid artery less than 50% stenosis in the right bulb the left common carotid artery had no significant atherosclerotic plaquing and no significant stenosis   A repeat  echocardiogram June 2021 again revealed mild concentric left ventricle hypertrophy with basal septal hypertrophy left ventricular outflow tract gradient of 59 mm not noted on the prior echo mild to moderate mitral valve regurgitation systolic anterior motion of the anterior leaflet of the mitral valve.  There appears to be some degree of aortic stenosis but cannot be accurately assessed because of the LVOT obstruction  We had increased her Toprol-XL to 100 twice a day.  She has lost a few pounds and overall is feeling well since the last visit several months ago.  She has no exertional chest pain exertional shortness of breath or exertional dizziness  Then she had an episode of shortness of breath with a moderate amount of exertion which was more than she has had in the past..  She has no edema orthopnea PND  EKG reveals normal sinus rhythm left ventricle hypertrophy with repolarization  CT coronary angiogram was nonconclusive but suggested the possibility of significant stenosis  Subsequent to this she underwent cardiac catheterization which revealed nonobstructive coronary disease no significant stenosis, hyperdynamic left ventricle with a resting gradient which increased significantly with provocation such as PVC post PVC and Valsalva  Patient had been complaining of increasing shortness of breath.  We increased her beta-blocker to 100 in the morning and 50 at night.  She has lost 6 pounds.  Overall she is feeling better with less shortness of breath she is able to do more activity.  She has no chest pain palpitations dizziness or syncope  Patient remains on valsartan 160/12.5.  She is now on Toprol- in the a.m. and 100 in the p.m. along with her other medications for hyperlipidemia and diabetes  She continues to feel better with less shortness of breath and is tolerating 200 mg of Toprol.  She has no chest pain dizziness or syncope.  She denies palpitations  Patient had been doing extremely well with less shortness of breath, she lost about 8 to 10 pounds. In October she went for routine endoscopy.  She did have a biopsy.  She went home and standing at the refrigerator she suddenly fell back felt dizzy and passed out hit her head.  She was taken to the emergency room at [facility] where she was found to be quite anemic received blood transfusion at [facility] to her head otherwise work-up was unremarkable.  Visit July 8, 2022:  She has been followed very carefully by  and her blood count has increased.  She is now on iron in addition to her other medications.  She did have shortness of breath but this now has resolved and she is feeling well without chest pain exertional dizziness exertional chest pain exertional shortness of breath.  She is very motivated to continue to lose weight.  Post recent endoscopy she had an episode of syncope when she came home and was found to be anemic probably fluid depleted.  The symptom has not recurred  Visit October 14, 2022: Overall since the last visit the patient has been feeling well.  She denies significant shortness of breath.  Her weight has not changed significantly at 185 pounds.  She denies exertional dizziness or syncope, she denies palpitations lightheadedness.  She has some mild exertional shortness of breath but is functioning at a good level.  Patient has been stable from a cardiac point of view has lost a few pounds.  She has no dizziness or syncope no exertional chest pain or chest pressure.  Recent echocardiogram by Dr. Valdez March 2022 revealed hypertrophic left ventricle with systolic anterior motion with a subaortic gradient moderate calcification of the aortic valve.  They were unable to assess the severity of the aortic stenosis  On her cardiac catheterization that I reviewed from approximately 6 months ago she had mild calcification of the aortic valve the valve was easy to cross and most of the gradient almost all the gradient was subaortic  Since the last visit several months ago, he has been feeling well has lost about 5 pounds.  She does not walk that much but she denies significant shortness of breath exertional dizziness and has not had syncope.    She does not push herself that much with walking and she is not sure how much she can do.  Occasionally when she is bending she has momentary shortness of breath but it resolves when she stands up.    Visit January 18, 2023: Patient has lost a few pounds since the last visit.  She denies chest pain chest pressure shortness of breath.  She has no exertional dizziness exertional chest pain.  She denies significant shortness of breath.  She has no edema orthopnea PND   patient had recent blood work which was apparently all in order.  She has lost about 3 to 4 pounds since the last visit.  She remains on stable medication  Visit September 26, 2023: Overall the patient feels well since last visit.  She denies chest pain chest pressure shortness of breath dizziness or syncope.  Her weight has been stable at 181 pounds but her blood pressure is elevated today at 170/686.  She did eat some salty yesterday but usually she is careful with salt in her diet  2D echo on April 11, 2023: Mild pulmonary hypertension mild left ventricular outflow tract obstruction though no gradient measured with Valsalva, mild concentric left ventricular pressure 50 severe mitral annular calcification and moderate mitral regurgitation  Visit December 27, 2023: Overall the patient has been stable since last visit but she has gained 3 pounds and not always careful with salt in her diet.  On last visit her blood pressure was elevated.  She does have exertional shortness of breath walking 1-2 blocks stops and then can continue on.  She carries a heavy package she feels more short of breath but then can go on after she rests.  There is no associated chest pain  Visit April 3, 2024:  Patient is lost about 6 to 7 pounds since last visit.  She feels well.  She denies shortness of breath exertional chest pain or chest pressure.  She has some mild shortness of breath with significant exertion.  She has no dizziness or syncope.  Her blood pressure in the office is apparently well-controlled.  She remains on stable medication.  She recently had a bronchitis which is now resolved she took antibiotics. She had been on Ozempic and Mounjaro but she felt ill with nausea decreased appetite some diarrhea.  She stopped these medications  Presently she is on Crestor 20 metformin 500 mg twice a day metoprolol  and valsartan 160  Visit October 2, 2024 The patient feels extremely well.  She is continue to lose weight with intermittent fasting.  She has occasional shortness of breath for a brief moment when she exerts herself significantly.  She is active now working at age 79.  She does not do regular exercise.  She had a recent echo which revealed moderate left atrial enlargement mild left ventricular outflow tract obstruction basal septal hypertrophy grade 2 diastolic dysfunction severe mitral annular calcification moderate mitral regurgitation right ventricular systolic pressure consistent with moderate pulmonary hypertension  Carotid Doppler no significant stenosis Patient's medications are stable and include Crestor 20 metformin 500 mg twice a day metoprolol  pantoprazole 40 and valsartan 160 EKG October 2, 2024 normal sinus rhythm T inversions 1 aVL probable LVH no acute changes

## 2024-10-02 NOTE — DISCUSSION/SUMMARY
[FreeTextEntry1] : Patient's cardiac status remains quite stable she is asymptomatic with a stable blood pressure and stable physical exam  She should continue her present regimen of medication.  I encouraged her to try to be more active and try to do regular exercise mostly walking at a vigorous pace  Routine follow-up again in 4 months [EKG obtained to assist in diagnosis and management of assessed problem(s)] : EKG obtained to assist in diagnosis and management of assessed problem(s)

## 2024-10-02 NOTE — ASSESSMENT
[FreeTextEntry1] : Roberta Mckenna has diabetes hypertension hyperlipidemia . She has evidence of atherosclerosis and coronary disease on CT coronary angiogram nonobstructive from several years ago. She is asymptomatic without chest pain chest pressure shortness of breath she is overweight but is making great attempts at losing weight. She is on appropriate medications for hypertension and diabetes.. Patient had been doing extremely well until a recent episode of GI bleed after an endoscopy but is now improved. since last visit she continues to improve, blood pressure is under good control and she has minimal shortness of breath. patient's cardiac status remains stable with controlled blood pressure no symptoms and stable physical exam.  Patient continues with exertional shortness of breath which is probably combination of diastolic dysfunction hypertrophic cardiomyopathy and her weight.  She does have obstructive sleep apnea but does not use a CPAP machine  Presently in April 2024 she remains quite stable with recent weight loss, better exercise tolerance, well-controlled blood pressure and stable physical exam..  October 24 she again remains stable with continued weight reduction and is asymptomatic without angina or significant shortness of breath.  Echo has some mild mild left ventricular outflow tract obstruction with mild to moderate pulmonary hypertension   1. Hypertrophic cardiomyopathy with resting and provocable gradient now improved symptoms of shortness of breath on 100 of metoprolol in the morning and 100 at night. She is free of significant shortness of breath or chest pain. I do not believe the aortic stenosis is significant at this point but needs to be watched carefully. She is clinically stable from the hypertrophic point of view. There has been no change in his symptoms and her physical exam is unchanged.  2D echo April 2023 revealed only a mild left ventricular outflow tract gradient mild pulmonary hypertension normal left ventricular function probable diastolic dysfunction.  Her murmur in April 2024 does increase with Valsalva but her hypertrophic cardiomyopathy is quite stable..  There is no change in his status in October 24 with a mild left ventricular outflow tract obstruction on most recent echo  2. Hyperlipidemia on statin cholesterol 163 triglycerides 129 HDL 76 LDL 61 at goal.  3. Diabetes type 2 on oral agents recent hemoglobin A1c 5.9 on last visit repeat bloods were drawn today  4. Elevated BMI patient has continued to lose weight on intermittent fasting  5. Nonobstructive coronary disease   8. Essential hypertension well-controlled.

## 2025-02-05 ENCOUNTER — APPOINTMENT (OUTPATIENT)
Dept: CARDIOLOGY | Facility: CLINIC | Age: 80
End: 2025-02-05

## 2025-02-12 ENCOUNTER — APPOINTMENT (OUTPATIENT)
Dept: CARDIOLOGY | Facility: CLINIC | Age: 80
End: 2025-02-12
Payer: MEDICARE

## 2025-02-12 VITALS
OXYGEN SATURATION: 98 % | DIASTOLIC BLOOD PRESSURE: 78 MMHG | SYSTOLIC BLOOD PRESSURE: 170 MMHG | RESPIRATION RATE: 15 BRPM | BODY MASS INDEX: 32.42 KG/M2 | HEART RATE: 67 BPM | WEIGHT: 166 LBS

## 2025-02-12 DIAGNOSIS — I25.10 ATHEROSCLEROTIC HEART DISEASE OF NATIVE CORONARY ARTERY W/OUT ANGINA PECTORIS: ICD-10-CM

## 2025-02-12 DIAGNOSIS — I10 ESSENTIAL (PRIMARY) HYPERTENSION: ICD-10-CM

## 2025-02-12 DIAGNOSIS — E78.2 MIXED HYPERLIPIDEMIA: ICD-10-CM

## 2025-02-12 DIAGNOSIS — I42.2 OTHER HYPERTROPHIC CARDIOMYOPATHY: ICD-10-CM

## 2025-02-12 PROCEDURE — G2211 COMPLEX E/M VISIT ADD ON: CPT

## 2025-02-12 PROCEDURE — 99214 OFFICE O/P EST MOD 30 MIN: CPT

## 2025-06-17 ENCOUNTER — APPOINTMENT (OUTPATIENT)
Dept: CARDIOLOGY | Facility: CLINIC | Age: 80
End: 2025-06-17
Payer: MEDICARE

## 2025-06-17 ENCOUNTER — NON-APPOINTMENT (OUTPATIENT)
Age: 80
End: 2025-06-17

## 2025-06-17 VITALS — WEIGHT: 164 LBS | BODY MASS INDEX: 32.03 KG/M2

## 2025-06-17 VITALS — OXYGEN SATURATION: 98 % | HEART RATE: 70 BPM | DIASTOLIC BLOOD PRESSURE: 70 MMHG | SYSTOLIC BLOOD PRESSURE: 125 MMHG

## 2025-06-17 PROCEDURE — 93000 ELECTROCARDIOGRAM COMPLETE: CPT

## 2025-06-17 PROCEDURE — 99214 OFFICE O/P EST MOD 30 MIN: CPT

## 2025-06-17 PROCEDURE — G2211 COMPLEX E/M VISIT ADD ON: CPT
